# Patient Record
Sex: MALE | Race: WHITE | Employment: OTHER | URBAN - METROPOLITAN AREA
[De-identification: names, ages, dates, MRNs, and addresses within clinical notes are randomized per-mention and may not be internally consistent; named-entity substitution may affect disease eponyms.]

---

## 2019-01-02 ENCOUNTER — DOCUMENTATION ONLY (OUTPATIENT)
Dept: OTHER | Facility: CLINIC | Age: 69
End: 2019-01-02

## 2019-03-07 ENCOUNTER — HOSPITAL ENCOUNTER (OUTPATIENT)
Dept: RESPIRATORY THERAPY | Facility: HOSPITAL | Age: 69
Discharge: HOME | End: 2019-03-07
Attending: FAMILY MEDICINE | Admitting: FAMILY MEDICINE
Payer: MEDICARE

## 2019-03-07 LAB
COHGB MFR BLD: 4 % (ref 0–2)
HGB BLD-MCNC: 9 G/DL (ref 13.3–17.7)

## 2019-03-07 PROCEDURE — 85018 HEMOGLOBIN: CPT | Performed by: FAMILY MEDICINE

## 2019-03-07 PROCEDURE — 94726 PLETHYSMOGRAPHY LUNG VOLUMES: CPT | Mod: 26 | Performed by: INTERNAL MEDICINE

## 2019-03-07 PROCEDURE — 94729 DIFFUSING CAPACITY: CPT

## 2019-03-07 PROCEDURE — 94060 EVALUATION OF WHEEZING: CPT

## 2019-03-07 PROCEDURE — 25000125 ZZHC RX 250: Performed by: FAMILY MEDICINE

## 2019-03-07 PROCEDURE — 82375 ASSAY CARBOXYHB QUANT: CPT | Performed by: FAMILY MEDICINE

## 2019-03-07 PROCEDURE — 94060 EVALUATION OF WHEEZING: CPT | Mod: 26 | Performed by: INTERNAL MEDICINE

## 2019-03-07 PROCEDURE — 94726 PLETHYSMOGRAPHY LUNG VOLUMES: CPT

## 2019-03-07 PROCEDURE — 36415 COLL VENOUS BLD VENIPUNCTURE: CPT | Performed by: FAMILY MEDICINE

## 2019-03-07 PROCEDURE — 94729 DIFFUSING CAPACITY: CPT | Mod: 26 | Performed by: INTERNAL MEDICINE

## 2019-03-07 RX ORDER — ALBUTEROL SULFATE 0.83 MG/ML
2.5 SOLUTION RESPIRATORY (INHALATION)
Status: COMPLETED | OUTPATIENT
Start: 2019-03-07 | End: 2019-03-07

## 2019-03-07 RX ADMIN — ALBUTEROL SULFATE 2.5 MG: 2.5 SOLUTION RESPIRATORY (INHALATION) at 15:26

## 2019-03-09 ENCOUNTER — DOCUMENTATION ONLY (OUTPATIENT)
Dept: OTHER | Facility: CLINIC | Age: 69
End: 2019-03-09

## 2019-04-10 ENCOUNTER — OFFICE VISIT (OUTPATIENT)
Dept: SURGERY | Facility: OTHER | Age: 69
End: 2019-04-10
Attending: SURGERY
Payer: MEDICARE

## 2019-04-10 VITALS
HEART RATE: 73 BPM | TEMPERATURE: 97.3 F | WEIGHT: 261 LBS | OXYGEN SATURATION: 88 % | DIASTOLIC BLOOD PRESSURE: 52 MMHG | SYSTOLIC BLOOD PRESSURE: 94 MMHG

## 2019-04-10 DIAGNOSIS — Z79.01 LONG TERM (CURRENT) USE OF ANTICOAGULANTS: ICD-10-CM

## 2019-04-10 DIAGNOSIS — Z12.11 SPECIAL SCREENING FOR MALIGNANT NEOPLASMS, COLON: Primary | ICD-10-CM

## 2019-04-10 DIAGNOSIS — I10 ESSENTIAL HYPERTENSION: ICD-10-CM

## 2019-04-10 DIAGNOSIS — D50.9 MICROCYTIC ANEMIA: ICD-10-CM

## 2019-04-10 PROBLEM — Z86.0101 HISTORY OF ADENOMATOUS POLYP OF COLON: Status: ACTIVE | Noted: 2019-03-18

## 2019-04-10 PROBLEM — H81.11 BPPV (BENIGN PAROXYSMAL POSITIONAL VERTIGO), RIGHT: Status: ACTIVE | Noted: 2019-03-18

## 2019-04-10 PROBLEM — R42 DIZZINESS: Status: ACTIVE | Noted: 2019-03-18

## 2019-04-10 PROBLEM — R45.4 IRRITABILITY AND ANGER: Status: ACTIVE | Noted: 2018-12-04

## 2019-04-10 LAB
ANION GAP SERPL CALCULATED.3IONS-SCNC: 4 MMOL/L (ref 3–14)
APTT PPP: 39 SEC (ref 24–37)
BUN SERPL-MCNC: 17 MG/DL (ref 7–30)
CALCIUM SERPL-MCNC: 8.7 MG/DL (ref 8.5–10.1)
CHLORIDE SERPL-SCNC: 105 MMOL/L (ref 94–109)
CO2 SERPL-SCNC: 29 MMOL/L (ref 20–32)
CREAT SERPL-MCNC: 1.26 MG/DL (ref 0.66–1.25)
ERYTHROCYTE [DISTWIDTH] IN BLOOD BY AUTOMATED COUNT: 24.3 % (ref 10–15)
GFR SERPL CREATININE-BSD FRML MDRD: 58 ML/MIN/{1.73_M2}
GLUCOSE SERPL-MCNC: 100 MG/DL (ref 70–99)
HCT VFR BLD AUTO: 33.8 % (ref 40–53)
HGB BLD-MCNC: 9 G/DL (ref 13.3–17.7)
INR PPP: 3.28 (ref 0.8–1.2)
MCH RBC QN AUTO: 20 PG (ref 26.5–33)
MCHC RBC AUTO-ENTMCNC: 26.6 G/DL (ref 31.5–36.5)
MCV RBC AUTO: 75 FL (ref 78–100)
PLATELET # BLD AUTO: 401 10E9/L (ref 150–450)
POTASSIUM SERPL-SCNC: 4.5 MMOL/L (ref 3.4–5.3)
RBC # BLD AUTO: 4.51 10E12/L (ref 4.4–5.9)
SODIUM SERPL-SCNC: 138 MMOL/L (ref 133–144)
WBC # BLD AUTO: 11.2 10E9/L (ref 4–11)

## 2019-04-10 PROCEDURE — 80048 BASIC METABOLIC PNL TOTAL CA: CPT | Mod: ZL | Performed by: SURGERY

## 2019-04-10 PROCEDURE — 85730 THROMBOPLASTIN TIME PARTIAL: CPT | Mod: ZL | Performed by: SURGERY

## 2019-04-10 PROCEDURE — 93010 ELECTROCARDIOGRAM REPORT: CPT | Performed by: INTERNAL MEDICINE

## 2019-04-10 PROCEDURE — G0463 HOSPITAL OUTPT CLINIC VISIT: HCPCS

## 2019-04-10 PROCEDURE — 85610 PROTHROMBIN TIME: CPT | Mod: ZL | Performed by: SURGERY

## 2019-04-10 PROCEDURE — 36415 COLL VENOUS BLD VENIPUNCTURE: CPT | Mod: ZL | Performed by: SURGERY

## 2019-04-10 PROCEDURE — 93005 ELECTROCARDIOGRAM TRACING: CPT

## 2019-04-10 PROCEDURE — 99204 OFFICE O/P NEW MOD 45 MIN: CPT | Performed by: SURGERY

## 2019-04-10 PROCEDURE — 85027 COMPLETE CBC AUTOMATED: CPT | Mod: ZL | Performed by: SURGERY

## 2019-04-10 PROCEDURE — G0463 HOSPITAL OUTPT CLINIC VISIT: HCPCS | Mod: 25

## 2019-04-10 RX ORDER — LEVOTHYROXINE SODIUM 100 UG/1
100 TABLET ORAL
COMMUNITY
Start: 2019-02-14 | End: 2022-01-01

## 2019-04-10 RX ORDER — METOPROLOL TARTRATE 25 MG/1
TABLET, FILM COATED ORAL
COMMUNITY
Start: 2019-03-12

## 2019-04-10 RX ORDER — WARFARIN SODIUM 5 MG/1
TABLET ORAL
COMMUNITY
Start: 2019-03-20

## 2019-04-10 RX ORDER — ATORVASTATIN CALCIUM 40 MG/1
TABLET, FILM COATED ORAL
COMMUNITY
Start: 2019-01-09

## 2019-04-10 RX ORDER — GABAPENTIN 300 MG/1
CAPSULE ORAL
COMMUNITY
Start: 2018-12-04

## 2019-04-10 RX ORDER — GATIFLOXACIN 5 MG/ML
SOLUTION/ DROPS OPHTHALMIC
Refills: 1 | Status: ON HOLD | COMMUNITY
Start: 2019-02-09 | End: 2019-04-29

## 2019-04-10 RX ORDER — HYDROCODONE BITARTRATE AND ACETAMINOPHEN 5; 325 MG/1; MG/1
TABLET ORAL
COMMUNITY
Start: 2019-01-08

## 2019-04-10 RX ORDER — PREDNISOLONE ACETATE 10 MG/ML
SUSPENSION/ DROPS OPHTHALMIC
Refills: 1 | Status: ON HOLD | COMMUNITY
Start: 2019-02-09 | End: 2019-04-29

## 2019-04-10 RX ORDER — KETOROLAC TROMETHAMINE 5 MG/ML
SOLUTION OPHTHALMIC
Refills: 1 | Status: ON HOLD | COMMUNITY
Start: 2019-02-08 | End: 2019-04-29

## 2019-04-10 RX ORDER — LISINOPRIL 20 MG/1
TABLET ORAL
COMMUNITY
Start: 2019-04-08

## 2019-04-10 RX ORDER — ASPIRIN 325 MG
81 TABLET ORAL
COMMUNITY
Start: 2004-06-07

## 2019-04-10 RX ORDER — PAROXETINE 20 MG/1
TABLET, FILM COATED ORAL
Refills: 1 | COMMUNITY
Start: 2019-02-08

## 2019-04-10 RX ORDER — NITROGLYCERIN 0.4 MG/1
1 TABLET SUBLINGUAL EVERY 5 MIN PRN
COMMUNITY
Start: 2017-10-27

## 2019-04-10 RX ORDER — CELECOXIB 200 MG/1
CAPSULE ORAL
Refills: 0 | Status: ON HOLD | COMMUNITY
Start: 2019-02-09 | End: 2019-04-29

## 2019-04-10 RX ORDER — ALBUTEROL SULFATE 90 UG/1
AEROSOL, METERED RESPIRATORY (INHALATION)
COMMUNITY
Start: 2019-04-09

## 2019-04-10 RX ORDER — SODIUM, POTASSIUM,MAG SULFATES 17.5-3.13G
SOLUTION, RECONSTITUTED, ORAL ORAL
Qty: 2 BOTTLE | Refills: 0 | Status: ON HOLD | OUTPATIENT
Start: 2019-04-10 | End: 2019-04-29

## 2019-04-10 RX ORDER — TADALAFIL 20 MG/1
20 TABLET ORAL
COMMUNITY
Start: 2018-02-28

## 2019-04-10 RX ORDER — CYCLOBENZAPRINE HCL 10 MG
TABLET ORAL
COMMUNITY
Start: 2018-12-04

## 2019-04-10 RX ORDER — HYDROCHLOROTHIAZIDE 25 MG/1
TABLET ORAL
COMMUNITY
Start: 2019-02-06

## 2019-04-10 ASSESSMENT — PAIN SCALES - GENERAL: PAINLEVEL: SEVERE PAIN (6)

## 2019-04-10 NOTE — PROGRESS NOTES
[facility] Surgery Consultation    CC:  Anemia    HPI:  This 68 year old year old male is seen at the request of Papi Forbes for evaluation of anemia.  The history is obtained from the patient, and reviewing the medical record.  He is good medical historian. He says that he was being evaluated by his primary and had labs done which demonstrated microcytic anemia. He has no complaints of hematuria, hematochezia, melena, hematemesis. He has no abdominal pain, bloating, or cramping. He has undergone previous colonoscopy with multiple tubular adenomas found. He has no reflux, regurgitation or heartburn symptoms. He is on full dose aspirin and coumadin for below the knee DVT.    Past Medical History:   Diagnosis Date     Anemia      Cataract      Colon polyps      COPD (chronic obstructive pulmonary disease) (H)      Degeneration of lumbar or lumbosacral intervertebral disc 10/6/2006     DVT, lower extremity, recurrent (H) 7/7/2015     Hyperlipidemia 6/6/2006     Hypertension 6/6/2006     Hypothyroidism        Past Surgical History:   Procedure Laterality Date     CATARACT IOL, RT/LT  2019     COLONOSCOPY  2012     STENT  2002       No family history on file.    Social History     Tobacco Use     Smoking status: Current Every Day Smoker     Smokeless tobacco: Former User   Substance Use Topics     Alcohol use: None     Drug use: None       Prior to Admission medications    Medication Sig Start Date End Date Taking? Authorizing Provider   aspirin (ASA) 325 MG tablet  6/7/04  Yes Reported, Patient   atorvastatin (LIPITOR) 40 MG tablet TAKE 1 TABLET DAILY AT BEDTIME 1/9/19  Yes Reported, Patient   cyclobenzaprine (FLEXERIL) 10 MG tablet TAKE 1 TABLET THREE TIMES A DAY 12/4/18  Yes Reported, Patient   diclofenac (VOLTAREN) 1 % topical gel Apply 4 g topically 12/31/18  Yes Reported, Patient   gabapentin (NEURONTIN) 300 MG capsule TAKE 2 CAPSULES THREE TIMES A DAY 12/4/18  Yes Reported, Patient   levothyroxine  (SYNTHROID/LEVOTHROID) 100 MCG tablet Take 100 mcg by mouth 2/14/19  Yes Reported, Patient   Na Sulfate-K Sulfate-Mg Sulf (SUPREP BOWEL PREP KIT) solution Drink 1 bottle 6PM prior to procedure followed by 2 16 oz glasses water over next hour. Repeat with 2nd bottle 6 hours prior to procedure 4/10/19  Yes Nacho Shore MD   nitroGLYcerin (NITROSTAT) 0.4 MG sublingual tablet Place 1 tablet under the tongue every 5 minutes as needed 10/27/17  Yes Reported, Patient   tadalafil (CIALIS) 20 MG tablet Take 20 mg by mouth 2/28/18  Yes Reported, Patient   celecoxib (CELEBREX) 200 MG capsule TK 1 C PO BID. 2/9/19   Reported, Patient   gatifloxacin (ZYMAXID) 0.5 % ophthalmic solution INSTILL 1 GTT IN THE RIGHT EYE 3 TIMES DAILY  STARTING 3 DAYS PRIOR TO SURGERY AND CONTINUE FOR 7 DAYS AFTER SURGERY. 2/9/19   Reported, Patient   hydrochlorothiazide (HYDRODIURIL) 25 MG tablet  2/6/19   Reported, Patient   HYDROcodone-acetaminophen (NORCO) 5-325 MG tablet  1/8/19   Reported, Patient   ketorolac (ACULAR) 0.5 % ophthalmic solution  2/8/19   Reported, Patient   lisinopril (PRINIVIL/ZESTRIL) 20 MG tablet  4/8/19   Reported, Patient   metoprolol tartrate (LOPRESSOR) 25 MG tablet  3/12/19   Reported, Patient   PARoxetine (PAXIL) 20 MG tablet TK 1 T PO ONCE D. 2/8/19   Reported, Patient   prednisoLONE acetate (PRED FORTE) 1 % ophthalmic suspension  2/9/19   Reported, Patient   PROAIR  (90 Base) MCG/ACT inhaler  4/9/19   Reported, Patient   warfarin (COUMADIN) 5 MG tablet  3/20/19   Reported, Patient       Pt denied problems with bleeding or anesthesia  No mood altering drug use.       Allergies   Allergen Reactions     Penicillins Shortness Of Breath       REVIEW OF SYSTEMS:  Ten point review of systems negative except those mentioned in the HPI.     The patient denies sleep apnea, latex allergies or MRSA    OBJECTIVE:    BP 94/52 (BP Location: Right arm, Patient Position: Chair, Cuff Size: Adult Large)   Pulse 73   Temp  97.3  F (36.3  C) (Tympanic)   Wt 118.4 kg (261 lb)   SpO2 (!) 88%     GENERAL: Generally appears well, in no distress with appropriate affect.  HEENT:   Sclerae anicteric - No cervical, supra/infraclavicular lymphadenopathy  Respiratory:  Lungs clear to ausculation bilaterally with good air excursion  Cardiovascular:  Regular Rate and Rhythm with no murmurs gallops or rubs, normal   Abdomen: soft, non-tender, non-distended  :  deferred  Extremities:  Extremities normal. No deformities, edema, or skin discoloration.  Skin:  no suspicious lesions or rashes  Neurological: grossly intact  Psych:  Alert, oriented, affect appropriate with normal decision making ability.      IMPRESSION:  67 yo male with anemia    PLAN:  I recommend that we proceed with upper and lower endoscopy for diagnostic evaluation of his microcytic anemia.  The indications, risks, benefits and technical aspects of esophagogastroduodenoscopy were reviewed with her questions asked and answered.  The indications, risks, benefits and technical aspects of whole colon colonoscopy were outlined with risks including, but not limited to, perforation, bleeding and inability to visualize entire colon.  Management of each was reviewed.  The need of mechanical preparation of the colon was reviewed along with the use of monitored anesthetic care.  The patient's questions were asked and answered.  Scheduled first available date.      Thank you for allowing me to participate in the care of your patient.       Nacho Shore MD    4/10/2019  2:41 PM    cc:  Papi Forbes

## 2019-04-10 NOTE — PATIENT INSTRUCTIONS
At [facility], we want to make sure that your colonoscopy is as pleasant as possible. This guide is designed to answer any questions you might have and to walk you through the preparations you will need to make before your procedure.  Should you have additional questions, please feel free to contact us at any other the numbers listed below. Thank you for choosing [facility].    Clinic Health Unit Coordinator: [phone]  Clinic Nurse: [phone]  Surgery Education Nurse: [phone]    GUIDE TO YOUR COLONOSCOPY WITH SUPREP/Upper Endoscopy    Date of Procedure:   4/29/19  with Dr. Shore  Admit time: Surgery Department will call you the day before your procedure by 5pm with your admit time. If your surgery is on Monday, please expect a call on Friday.  If we were unable to reach you before 5PM, you may call admitting at [phone]    Call the surgery nurse or your primary care provider if you should become ill within 1 week of your procedure and we will reschedule it when you are healthy. This can include fever, vomiting, chills, sore throat, cough, chest congestion,  runny nose, symptoms of a cold or any other illness.     Questions or concerns can be directed to the clinic or surgery education nurse at any of the numbers listed above. If you have a scheduling or appointment question, please call the Health Unit Coordinator between 8am and 4pm Monday through Friday. After hours or on weekends, please call [phone] to postpone.     Lab and X-ray/EKG tests needed today.    COLONOSCOPY PREP    7 DAYS BEFORE THE EXAM:  Do not take your 325 mg Aspirin (switch to 81 mg Aspirin 7 days before colonoscopy)or other NSAIDS (Ibuprofen, Motrin, Aleve, Celebrex, Naproxen, etc) 7 days before your surgery. Tylenol is fine. Stop taking fiber supplements, vitamins, iron or vitamins that contain iron, and herbals.  Do not eat any corn, nuts or seeds. You may continue your 81 mg Aspirin.  If you are  "prescribed blood thinners, talk to your primary care provider or anticoagulation provider for instructions on these medications.     Please call the Registered Nurse in Surgery Education at [phone] one to two weeks before your procedure and have an allergy and medication list ready.     Arrange transportation with a responsible adult to drive you home and stay with you for the next 4 hours when you arrive home for your safety. If you need to take a taxi or the bus, you must have a responsible adult to ride with you besides the . If you do not have a responsible adult you will be cancelled.      prescriptions at your pharmacy as soon as possible. If it has been over one week since your appointment was scheduled, please call your pharmacy to verify it is still ready for .     2 DAYS BEFORE THE EXAM:   Begin a low fiber diet. No raw fruits or vegatables or whole grains.  Please see the list of foods you can have and foods to avoid on page 3 of the separate \"Split-Dose SuPrep\" colonoscopy instruction packet. Drink at least 4-6 large glasses of sports drink each day (not red or purple).    1 DAY BEFORE THE EXAM:  No solid food or milk products after midnight. Drink only clear liquids all day. Nothing red or purple. Please see the list of liquids you can have and what to avoid on page 2 of the separate \"Split-Dose SuPrep\" colonoscopy instruction packet.       AT 6:00 PM THE EVENING PRIOR TO PROCEDURE:  Pour one 16 ounce bottle of Suprep liquid into the mixing container.  Add cool drinking water to the 16 ounce line on the container and mix.  Drink ALL the liquid in the container. You must drink 2 or more 16 ounce containers of water over the next hour. Stay near a toilet while using this medication.     DAY OF COLONOSCOPY PROCEDURE:  6 HOURS PRIOR TO THE EXAM (set an alarm):  Repeat the previous instructions with the 2nd bottle of Suprep  You must drink 2 or more 16 ounce containers of water over " "the next hour.  You should be done with with prep 4 hours before the exam.    You may continue clear liquids until 4 hours prior to exam.   If you must take medication, take it with a sip of water.    Wear comfortable clothes. No jewelry, body piercings, make-up, nail polish, hair spray, lotions, perfumes or colognes. Shower before you arrive.  Pass Christian in Admitting through the [place] Entrance.  You must have a  with you and and adult available to stay with your for 4 hours at home. The medicine used in this test will make you sleepy. If you do not have someone, please reschedule or your test will be cancelled. It is recommended that you do not drive for 24 hours after your test. Do not operate power equipment, drink alcoholic beverages, make important decisions or sign legal documents.     COLONOSCOPY FREQUENTLY ASKED QUESTIONS  What is a colonoscopy?    A colonoscopy is a test to look at the lining of your large intestine. The purpose of the exam is to check for abnormalities including growths called \"polyps\" that can lead to serious disease. A flexible scope is inserted into your rectum by the doctor to examine your large intestine.    What are polyps?  Polyps are abnormal growths on the lining of the colon. Most polyps are not cancerous, but some polyps have the potential to turn into cancer with time. Polyps can also bleed. For these reasons, most polyps are removed during a colonoscopy and sent to the laboratory for microscopic examination.    What preparation is needed?  The colon must be completely clean for the procedure to be performed. You may be given one or two different prep solutions to cleanse your bowel. You will also need to follow a clear liquid diet the day before your procedure.    What happens after the procedure?  After your procedure is complete, you will be taken back to your day surgery room where you will be monitored for approximately 1 hour. You can expect to feel drowsy for several " hours afterward. You may experience some cramping or bloating due to the air introduced into your colon during the exam. You will not be able to drive or operate machinery the rest of the day. You will be given written discharge instructions and appropriate learning material before you go home. You must have an adult to stay at home with you for the next 4 hours after you leave the hospital for your safety.    When will I find out the results of my test?  Your surgeon will talk to you and your designated  before you leave and usually the preliminary results can be given to you at that time. If a biopsy was taken during your procedure, it will be sent to the laboratory for examination. Results usually take one week. You will be contacted by phone or by letter with results.      TIPS FOR COLON CLEANSING BEFORE YOUR COLONOSCOPY  To get accurate results from your exam, your colon must be completely clean and empty. Please follow your doctor's instructions. If you do not, you may need to repeat both the exam and colon-cleansing process.    The medicine you take may cause bloating, nausea and other discomfort. Follow these tips to make the process as easy as possible:     You may use alcohol-free baby wipes to ease anal irritation. You may also use Vaseline to help protect the skin. Other options include Tucks wipes, hemorrhoid treatments and hydrocortisone cream.    Stay near a toilet! You will have diarrhea (loose watery stools) and may also have chills. Dress for comfort. Expect to feel discomfort until the stool clears from your colon. This usually takes about 2 to 4 hours.    If you followed your doctor's orders and your stool is a clear or yellow liquid, you are ready for the exam. If you are not sure if your colon is clean, please call your clinic and ask to speak to a nurse.       If SuPrep is not covered by insurance and you would like to opt for Golytely as an alternate prep, please call the nurse to  request a new prescription to your pharmacy. Sometimes the pharmacy will contact our office in advance if the prescription is not covered. The dietary instructions are the same for both bowel preps. Take Dulcolax 5mg at bedtime 2 nights before procedure and at 3pm day before procedure. Drink 1/2 of the the golytely at 6pm day before procedure 1  8 oz glass every 15 minutes. Repeat with the 2nd 1/2 of Golytely day of procedure 6 hours prior to check in time.

## 2019-04-10 NOTE — NURSING NOTE
Chief Complaint   Patient presents with     Consult     Vickey Forbes referring        Initial BP 94/52 (BP Location: Right arm, Patient Position: Chair, Cuff Size: Adult Large)   Pulse 73   Temp 97.3  F (36.3  C) (Tympanic)   Wt 118.4 kg (261 lb)   SpO2 (!) 88%  There is no height or weight on file to calculate BMI.  Medication Reconciliation: complete    Adry Easton LPN

## 2019-04-25 ENCOUNTER — ANESTHESIA EVENT (OUTPATIENT)
Dept: SURGERY | Facility: HOSPITAL | Age: 69
End: 2019-04-25
Payer: MEDICARE

## 2019-04-25 ASSESSMENT — COPD QUESTIONNAIRES
COPD: 1
CAT_SEVERITY: MILD

## 2019-04-25 ASSESSMENT — LIFESTYLE VARIABLES: TOBACCO_USE: 1

## 2019-04-25 NOTE — ANESTHESIA PREPROCEDURE EVALUATION
Anesthesia Pre-Procedure Evaluation    Patient: Brannon Spence   MRN: [id number] : 1950          Preoperative Diagnosis: MICROCYTIC ANEMIA    Procedure(s):  UPPER ENDOSCOPY,  COLONOSCOPY    Past Medical History:   Diagnosis Date     Anemia      Cataract      Colon polyps      COPD (chronic obstructive pulmonary disease) (H)      Degeneration of lumbar or lumbosacral intervertebral disc 10/6/2006     DVT, lower extremity, recurrent (H) 2015     Hyperlipidemia 2006     Hypertension 2006     Hypothyroidism      Past Surgical History:   Procedure Laterality Date     CATARACT IOL, RT/LT  2019     COLONOSCOPY       STENT         Anesthesia Evaluation     . Pt has had prior anesthetic.     No history of anesthetic complications          ROS/MED HX    ENT/Pulmonary:     (+)tobacco use, Current use 1.0 PPD packs/day  mild COPD, , . .    Neurologic:     (+)other neuro BPPV    Cardiovascular:     (+) Dyslipidemia, hypertension--CAD, -past MI (2000),-stent,2000  1 . : . . . :. .       METS/Exercise Tolerance:     Hematologic:     (+) History of blood clots Anemia (Microcytic anemia), -      Musculoskeletal: Comment: Degeneration of lumbar or lumbosacral intervertebral disc  (+) arthritis,  other musculoskeletal- DDD      GI/Hepatic:     (+) bowel prep,       Renal/Genitourinary:  - ROS Renal section negative       Endo:     (+) thyroid problem hypothyroidism, Obesity, .      Psychiatric:  - neg psychiatric ROS       Infectious Disease:  - neg infectious disease ROS       Malignancy:      - no malignancy   Other:    - neg other ROS                      Physical Exam  Normal systems: cardiovascular    Airway   Mallampati: III  TM distance: >3 FB  Neck ROM: full    Dental   (+) upper dentures, lower dentures and missing    Cardiovascular   Rhythm and rate: regular and normal      Pulmonary    breath sounds clear to auscultation(+) decreased breath sounds               Lab Results   Component  Value Date    WBC 11.2 (H) 04/10/2019    HGB 9.0 (L) 04/10/2019    HCT 33.8 (L) 04/10/2019     04/10/2019     04/10/2019    POTASSIUM 4.5 04/10/2019    CHLORIDE 105 04/10/2019    CO2 29 04/10/2019    BUN 17 04/10/2019    CR 1.26 (H) 04/10/2019     (H) 04/10/2019    HAILY 8.7 04/10/2019    PTT 39 (H) 04/10/2019    INR 3.28 (H) 04/10/2019       Preop Vitals  BP Readings from Last 3 Encounters:   04/10/19 94/52    Pulse Readings from Last 3 Encounters:   04/10/19 73      Resp Readings from Last 3 Encounters:   No data found for Resp    SpO2 Readings from Last 3 Encounters:   04/10/19 (!) 88%      Temp Readings from Last 1 Encounters:   04/10/19 97.3  F (36.3  C) (Tympanic)    Ht Readings from Last 1 Encounters:   No data found for Ht      Wt Readings from Last 1 Encounters:   04/10/19 118.4 kg (261 lb)    There is no height or weight on file to calculate BMI.       Anesthesia Plan      History & Physical Review  History and physical reviewed and following examination; no interval change.    ASA Status:  3 .    NPO Status:  > 8 hours    Plan for MAC with Intravenous and Propofol induction. Maintenance will be TIVA.  Reason for MAC:  Procedure to face, neck, head or breast, Chronic cardiopulmonary disease (G9) and Other - see comments  PONV prophylaxis:  Ondansetron (or other 5HT-3)   4-10-19  Surgeon requests deep sedation. Patient is an ASA 3 and will have prolonged procedure as planned combined upper endoscopy and colonoscopy. Will provide MAC.       Postoperative Care  Postoperative pain management:  IV analgesics.      Consents  Anesthetic plan, risks, benefits and alternatives discussed with:  Patient..                 BUD cMdowell CRNA

## 2019-04-29 ENCOUNTER — HOSPITAL ENCOUNTER (OUTPATIENT)
Facility: HOSPITAL | Age: 69
Discharge: HOME | End: 2019-04-29
Attending: SURGERY | Admitting: SURGERY
Payer: MEDICARE

## 2019-04-29 ENCOUNTER — ANESTHESIA (OUTPATIENT)
Dept: SURGERY | Facility: HOSPITAL | Age: 69
End: 2019-04-29
Payer: MEDICARE

## 2019-04-29 ENCOUNTER — APPOINTMENT (OUTPATIENT)
Dept: LAB | Facility: HOSPITAL | Age: 69
End: 2019-04-29
Attending: SURGERY
Payer: MEDICARE

## 2019-04-29 VITALS
SYSTOLIC BLOOD PRESSURE: 128 MMHG | OXYGEN SATURATION: 94 % | RESPIRATION RATE: 18 BRPM | DIASTOLIC BLOOD PRESSURE: 74 MMHG | TEMPERATURE: 97.1 F

## 2019-04-29 DIAGNOSIS — K25.4 GASTRIC ULCER WITH HEMORRHAGE, UNSPECIFIED CHRONICITY: Primary | ICD-10-CM

## 2019-04-29 LAB — INR PPP: 1.18 (ref 0.8–1.2)

## 2019-04-29 PROCEDURE — 43239 EGD BIOPSY SINGLE/MULTIPLE: CPT | Performed by: NURSE ANESTHETIST, CERTIFIED REGISTERED

## 2019-04-29 PROCEDURE — 36415 COLL VENOUS BLD VENIPUNCTURE: CPT | Performed by: NURSE ANESTHETIST, CERTIFIED REGISTERED

## 2019-04-29 PROCEDURE — 36000050 ZZH SURGERY LEVEL 2 1ST 30 MIN: Performed by: SURGERY

## 2019-04-29 PROCEDURE — 43239 EGD BIOPSY SINGLE/MULTIPLE: CPT | Performed by: ANESTHESIOLOGY

## 2019-04-29 PROCEDURE — 25000128 H RX IP 250 OP 636: Performed by: NURSE ANESTHETIST, CERTIFIED REGISTERED

## 2019-04-29 PROCEDURE — 25000125 ZZHC RX 250: Performed by: NURSE ANESTHETIST, CERTIFIED REGISTERED

## 2019-04-29 PROCEDURE — 71000027 ZZH RECOVERY PHASE 2 EACH 15 MINS: Performed by: SURGERY

## 2019-04-29 PROCEDURE — 88342 IMHCHEM/IMCYTCHM 1ST ANTB: CPT | Mod: TC | Performed by: SURGERY

## 2019-04-29 PROCEDURE — 43239 EGD BIOPSY SINGLE/MULTIPLE: CPT | Performed by: SURGERY

## 2019-04-29 PROCEDURE — 37000009 ZZH ANESTHESIA TECHNICAL FEE, EACH ADDTL 15 MIN: Performed by: SURGERY

## 2019-04-29 PROCEDURE — 25800030 ZZH RX IP 258 OP 636: Performed by: NURSE ANESTHETIST, CERTIFIED REGISTERED

## 2019-04-29 PROCEDURE — 45385 COLONOSCOPY W/LESION REMOVAL: CPT | Performed by: SURGERY

## 2019-04-29 PROCEDURE — 36000052 ZZH SURGERY LEVEL 2 EA 15 ADDTL MIN: Performed by: SURGERY

## 2019-04-29 PROCEDURE — 40000306 ZZH STATISTIC PRE PROC ASSESS II: Performed by: SURGERY

## 2019-04-29 PROCEDURE — 88305 TISSUE EXAM BY PATHOLOGIST: CPT | Mod: TC | Performed by: SURGERY

## 2019-04-29 PROCEDURE — 37000008 ZZH ANESTHESIA TECHNICAL FEE, 1ST 30 MIN: Performed by: SURGERY

## 2019-04-29 PROCEDURE — 45380 COLONOSCOPY AND BIOPSY: CPT | Mod: 59 | Performed by: SURGERY

## 2019-04-29 PROCEDURE — 27210794 ZZH OR GENERAL SUPPLY STERILE: Performed by: SURGERY

## 2019-04-29 PROCEDURE — 85610 PROTHROMBIN TIME: CPT | Performed by: NURSE ANESTHETIST, CERTIFIED REGISTERED

## 2019-04-29 RX ORDER — ALBUTEROL SULFATE 0.83 MG/ML
2.5 SOLUTION RESPIRATORY (INHALATION) EVERY 4 HOURS PRN
Status: DISCONTINUED | OUTPATIENT
Start: 2019-04-29 | End: 2019-04-29 | Stop reason: HOSPADM

## 2019-04-29 RX ORDER — SODIUM CHLORIDE, SODIUM LACTATE, POTASSIUM CHLORIDE, CALCIUM CHLORIDE 600; 310; 30; 20 MG/100ML; MG/100ML; MG/100ML; MG/100ML
INJECTION, SOLUTION INTRAVENOUS CONTINUOUS
Status: DISCONTINUED | OUTPATIENT
Start: 2019-04-29 | End: 2019-04-29 | Stop reason: HOSPADM

## 2019-04-29 RX ORDER — ONDANSETRON 2 MG/ML
4 INJECTION INTRAMUSCULAR; INTRAVENOUS EVERY 30 MIN PRN
Status: DISCONTINUED | OUTPATIENT
Start: 2019-04-29 | End: 2019-04-29 | Stop reason: HOSPADM

## 2019-04-29 RX ORDER — FENTANYL CITRATE 50 UG/ML
25-50 INJECTION, SOLUTION INTRAMUSCULAR; INTRAVENOUS
Status: DISCONTINUED | OUTPATIENT
Start: 2019-04-29 | End: 2019-04-29 | Stop reason: HOSPADM

## 2019-04-29 RX ORDER — FLUMAZENIL 0.1 MG/ML
0.2 INJECTION, SOLUTION INTRAVENOUS
Status: DISCONTINUED | OUTPATIENT
Start: 2019-04-29 | End: 2019-04-29 | Stop reason: HOSPADM

## 2019-04-29 RX ORDER — SUCRALFATE ORAL 1 G/10ML
1 SUSPENSION ORAL 4 TIMES DAILY
Qty: 420 ML | Refills: 1 | Status: SHIPPED | OUTPATIENT
Start: 2019-04-29 | End: 2019-05-20

## 2019-04-29 RX ORDER — HYDRALAZINE HYDROCHLORIDE 20 MG/ML
2.5-5 INJECTION INTRAMUSCULAR; INTRAVENOUS EVERY 10 MIN PRN
Status: DISCONTINUED | OUTPATIENT
Start: 2019-04-29 | End: 2019-04-29 | Stop reason: HOSPADM

## 2019-04-29 RX ORDER — LIDOCAINE HYDROCHLORIDE 20 MG/ML
INJECTION, SOLUTION INFILTRATION; PERINEURAL PRN
Status: DISCONTINUED | OUTPATIENT
Start: 2019-04-29 | End: 2019-04-29

## 2019-04-29 RX ORDER — PROPOFOL 10 MG/ML
INJECTION, EMULSION INTRAVENOUS PRN
Status: DISCONTINUED | OUTPATIENT
Start: 2019-04-29 | End: 2019-04-29

## 2019-04-29 RX ORDER — MEPERIDINE HYDROCHLORIDE 50 MG/ML
12.5 INJECTION INTRAMUSCULAR; INTRAVENOUS; SUBCUTANEOUS
Status: DISCONTINUED | OUTPATIENT
Start: 2019-04-29 | End: 2019-04-29 | Stop reason: HOSPADM

## 2019-04-29 RX ORDER — NALOXONE HYDROCHLORIDE 0.4 MG/ML
.1-.4 INJECTION, SOLUTION INTRAMUSCULAR; INTRAVENOUS; SUBCUTANEOUS
Status: DISCONTINUED | OUTPATIENT
Start: 2019-04-29 | End: 2019-04-29 | Stop reason: HOSPADM

## 2019-04-29 RX ORDER — LIDOCAINE 40 MG/G
CREAM TOPICAL
Status: DISCONTINUED | OUTPATIENT
Start: 2019-04-29 | End: 2019-04-29 | Stop reason: HOSPADM

## 2019-04-29 RX ORDER — ONDANSETRON 4 MG/1
4 TABLET, ORALLY DISINTEGRATING ORAL EVERY 30 MIN PRN
Status: DISCONTINUED | OUTPATIENT
Start: 2019-04-29 | End: 2019-04-29 | Stop reason: HOSPADM

## 2019-04-29 RX ORDER — HYDROMORPHONE HYDROCHLORIDE 1 MG/ML
.3-.5 INJECTION, SOLUTION INTRAMUSCULAR; INTRAVENOUS; SUBCUTANEOUS EVERY 10 MIN PRN
Status: DISCONTINUED | OUTPATIENT
Start: 2019-04-29 | End: 2019-04-29 | Stop reason: HOSPADM

## 2019-04-29 RX ADMIN — PROPOFOL 20 MG: 10 INJECTION, EMULSION INTRAVENOUS at 09:27

## 2019-04-29 RX ADMIN — PROPOFOL 20 MG: 10 INJECTION, EMULSION INTRAVENOUS at 09:55

## 2019-04-29 RX ADMIN — PROPOFOL 20 MG: 10 INJECTION, EMULSION INTRAVENOUS at 09:38

## 2019-04-29 RX ADMIN — PROPOFOL 20 MG: 10 INJECTION, EMULSION INTRAVENOUS at 09:26

## 2019-04-29 RX ADMIN — PROPOFOL 20 MG: 10 INJECTION, EMULSION INTRAVENOUS at 09:32

## 2019-04-29 RX ADMIN — PROPOFOL 30 MG: 10 INJECTION, EMULSION INTRAVENOUS at 09:17

## 2019-04-29 RX ADMIN — PROPOFOL 20 MG: 10 INJECTION, EMULSION INTRAVENOUS at 09:47

## 2019-04-29 RX ADMIN — PROPOFOL 40 MG: 10 INJECTION, EMULSION INTRAVENOUS at 09:36

## 2019-04-29 RX ADMIN — PROPOFOL 20 MG: 10 INJECTION, EMULSION INTRAVENOUS at 09:24

## 2019-04-29 RX ADMIN — PROPOFOL 20 MG: 10 INJECTION, EMULSION INTRAVENOUS at 10:00

## 2019-04-29 RX ADMIN — PROPOFOL 50 MG: 10 INJECTION, EMULSION INTRAVENOUS at 09:15

## 2019-04-29 RX ADMIN — PROPOFOL 20 MG: 10 INJECTION, EMULSION INTRAVENOUS at 09:22

## 2019-04-29 RX ADMIN — PROPOFOL 30 MG: 10 INJECTION, EMULSION INTRAVENOUS at 09:18

## 2019-04-29 RX ADMIN — PROPOFOL 20 MG: 10 INJECTION, EMULSION INTRAVENOUS at 09:50

## 2019-04-29 RX ADMIN — PROPOFOL 20 MG: 10 INJECTION, EMULSION INTRAVENOUS at 10:02

## 2019-04-29 RX ADMIN — PROPOFOL 20 MG: 10 INJECTION, EMULSION INTRAVENOUS at 09:31

## 2019-04-29 RX ADMIN — PROPOFOL 20 MG: 10 INJECTION, EMULSION INTRAVENOUS at 10:06

## 2019-04-29 RX ADMIN — PROPOFOL 20 MG: 10 INJECTION, EMULSION INTRAVENOUS at 10:13

## 2019-04-29 RX ADMIN — LIDOCAINE HYDROCHLORIDE 40 MG: 20 INJECTION, SOLUTION INFILTRATION; PERINEURAL at 09:15

## 2019-04-29 RX ADMIN — PROPOFOL 40 MG: 10 INJECTION, EMULSION INTRAVENOUS at 09:41

## 2019-04-29 RX ADMIN — PROPOFOL 40 MG: 10 INJECTION, EMULSION INTRAVENOUS at 09:44

## 2019-04-29 RX ADMIN — PROPOFOL 30 MG: 10 INJECTION, EMULSION INTRAVENOUS at 09:20

## 2019-04-29 RX ADMIN — PROPOFOL 20 MG: 10 INJECTION, EMULSION INTRAVENOUS at 10:09

## 2019-04-29 RX ADMIN — PROPOFOL 20 MG: 10 INJECTION, EMULSION INTRAVENOUS at 09:58

## 2019-04-29 RX ADMIN — PROPOFOL 20 MG: 10 INJECTION, EMULSION INTRAVENOUS at 09:52

## 2019-04-29 RX ADMIN — SODIUM CHLORIDE, POTASSIUM CHLORIDE, SODIUM LACTATE AND CALCIUM CHLORIDE: 600; 310; 30; 20 INJECTION, SOLUTION INTRAVENOUS at 09:12

## 2019-04-29 NOTE — DISCHARGE INSTRUCTIONS
UPPER ENDOSCOPY    AFTER THE PROCEDURE    You will return to Same Day Surgery to rest for about an hour before you go home.    The doctor will talk with you and your family.    A family member/friend may visit with you.    You may burp up any air remaining in your stomach.    You may feel dizzy or light-headed from the medicine.    Your nurse will go over the discharge instructions with you and your caregiver and answer any of your questions.      You will be contacted the next day to check on how you are doing.    If biopsies were taken, you will be contacted with the results usually within 3 days.  BACK AT HOME    Rest for an hour or two after you get home.    When your throat is no longer numb and you have a gag reflex, take a few sips of cool water.  If you can swallow comfortably, you may start eating again.    You may have a mild sore throat for the rest of the day.    You will be contacted with results by the surgeons office within a week. If not contacted in one week, call the surgeons office.  WHAT TO WATCH FOR:  Problems rarely occur after the exam, but it is important to be aware of the early signs of a complication.  Call your doctor immediately if you have:    Difficulty swallowing or breathing    Unusual pain in your stomach or chest    Vomiting blood or dark material that looks like coffee grounds    Black or tarry stools    Temperature over 101.5 degrees    MORE QUESTIONS?  Please ask your doctor or nurse before the exam begins  or call your doctor at the clinic.    IF YOU MUST CANCEL YOUR PROCEDURE THE EVENING/NIGHT BEFORE, PLEASE CALL HOSPITAL ADMITTING -[phone] OR TOLL FREE [phone], [phone].    Phone Numbers:  [facility] - [phone]  [facility] - [phone]  Surgery Patient Education - [phone] or toll free [phone]    INSTRUCTIONS AFTER COLONOSCOPY    WHEN YOU ARE BACK HOME:    Plan to rest for an hour or two after you get home.    You may have  some cramping or pressure until you pass gas.    You may resume your regular medications.    Eat a small, light meal at first, and then gradually return to normal meal sizes.    You will be contacted the next day to see how you are doing.  If you had a polyp removed:    Slight bleeding may occur.  You may have a slight blood stain on the toilet paper after a bowel movement.    To lessen the chance of bleeding, avoid heavy exercise for ONE WEEK.  This includes heavy lifting, vigorous sport activities, and heavy physical labor.  You may resume your normal sexual activity.      Avoid aspirin or aspirin products if instructed by your doctor.    If there is a polyp or biopsy, you will be contacted with results within one week.     WHAT TO WATCH FOR:  Problems rarely occur after the exam; however, it is important for you to watch for early signs of possible problems.  If you have     Unusual pain in your abdomen    Nausea and vomiting that persists    Excessive bleeding    Black or bloody bowel movements    Fever or temperature above 100.6 F  Please call your doctor ([facility] [phone]) or go to the nearest hospital emergency room.    Post-Anesthesia Patient Instructions    IMMEDIATELY FOLLOWING SURGERY:  Do not drive or operate machinery for the first twenty four hours after surgery.  Do not make any important decisions for twenty four hours after surgery or while taking narcotic pain medications or sedatives.  If you develop intractable nausea and vomiting or a severe headache please notify your doctor immediately.    FOLLOW-UP:  Please make an appointment with your surgeon as instructed. You do not need to follow up with anesthesia unless specifically instructed to do so.    WOUND CARE INSTRUCTIONS (if applicable):  Keep a dry clean dressing on the anesthesia/puncture wound site if there is drainage.  Once the wound has quit draining you may leave it open to air.  Generally you should leave the bandage intact  for twenty four hours unless there is drainage.  If the epidural site drains for more than 36-48 hours please call the anesthesia department.    QUESTIONS?:  Please feel free to call your physician or the hospital  if you have any questions, and they will be happy to assist you.

## 2019-04-29 NOTE — ANESTHESIA CARE TRANSFER NOTE
Patient: Brannon Spence    Procedure(s):  UPPER ENDOSCOPY WITH BIOPSY AND APC OF STOMACH,  COLONOSCOPY WITH POLYPECTOMY AND BIOPSY    Diagnosis: MICROCYTIC ANEMIA  Diagnosis Additional Information: No value filed.    Anesthesia Type:   MAC     Note:  Airway :Room Air  Patient transferred to:Phase II  Handoff Report: Identifed the Patient, Identified the Reponsible Provider, Reviewed the pertinent medical history, Discussed the surgical course, Reviewed Intra-OP anesthesia mangement and issues during anesthesia, Set expectations for post-procedure period and Allowed opportunity for questions and acknowledgement of understanding      Vitals: (Last set prior to Anesthesia Care Transfer)    CRNA VITALS  4/29/2019 0945 - 4/29/2019 1019      4/29/2019             Pulse:  105    Ht Rate:  104    SpO2:  95 %    Resp Rate (set):  8                Electronically Signed By: BUD Montana CRNA  April 29, 2019  10:19 AM

## 2019-04-29 NOTE — OR NURSING
in and spoke with pt.Up w/o vertigo.Denies pain.Dyspneic with exertion.Patient and responsible adult given discharge instructions with no questions regarding instructions. Nanette score 20. Pain level 0/10.  Discharged from unit via w/c. Patient discharged to home.

## 2019-04-29 NOTE — ANESTHESIA POSTPROCEDURE EVALUATION
Patient: Brannon Spence    Procedure(s):  UPPER ENDOSCOPY WITH BIOPSY AND APC OF STOMACH,  COLONOSCOPY WITH POLYPECTOMY AND BIOPSY    Diagnosis:MICROCYTIC ANEMIA  Diagnosis Additional Information: No value filed.    Anesthesia Type:  MAC    Note:  Anesthesia Post Evaluation    Patient location during evaluation: Phase 2 and Bedside  Patient participation: Able to fully participate in evaluation  Level of consciousness: awake and alert  Pain management: adequate  Airway patency: patent  Cardiovascular status: acceptable  Respiratory status: acceptable  Hydration status: stable  PONV: none     Anesthetic complications: None          Last vitals:  Vitals:    04/29/19 1035 04/29/19 1040 04/29/19 1045   BP: (!) 146/111 133/80 136/88   Resp: 16 16 18   Temp:      SpO2: 93% (!) 91% (!) 91%         Electronically Signed By: Aneudy Valero MD  April 29, 2019  10:52 AM

## 2019-04-29 NOTE — OP NOTE
Brannon Spence MRN# [id number]   YOB: 1950 Age: 68 year old      Date of Admission:  4/29/2019    Primary care provider: Waqas Benjamin    PREOPERATIVE DIAGNOSIS:  Anemia      POSTOPERATIVE DIAGNOSES:    Antral ulcers, henriquez's esophagitis, ascending colon polyp x 3, ascending colon mucosal abnormality, rectal polyp      PROCEDURE:  Esophagogastroduodenoscopy with cold forceps biopsies, argon plasma coagulation to antral ulcers, colonoscopy with hot snare polypectomy and cold forceps biopsy.       HISTORY OF PRESENT ILLNESS:  This 68 year old male developed anemia requiring upper endoscopy     OPERATIVE FINDINGS:  The gastroesophageal junction was noted 40cm from the incisors.  The Z line was irregular with changes suggesting reflux.  There was evidence of ulceration in the antrum.  There were mucosal changes suggestive of Henriquez's at the GE junction. There were multiple ascending colon polyps. In the ascending colon there was a mucosal abnormality which was biopsied. There was a small rectal polyp.    Specimen(s) submitted to pathology:  Antral biopsies, antral ulcer biopsies, GE junction biopsies, ascending colon polyps x 3, ascending colon biopsy, rectal polyp    PROCEDURE:  With the patient in the supine position on the transport cart, IV sedation was administered by the nurse anesthetist.  Her correct identity and planned procedure were confirmed during a requisite timeout pause.  A bite block was placed and the fiberoptic endoscope was introduced and negotiated through the cricopharyngeus without difficulty.  The length of the esophagus was examined without findings.  The gastroesophageal junction was noted 40 cm from the incisors; the Z line was irregular without ulceration, bleeding source or stricture.  There was evidence of Henriquez's change.  The stomach was entered and the pylorus was traversed easily.  The gastric mucosa was abnormal; there was evidence of ulceration in the gastric  antrum.  The duodenum was without finding.  The endoscope was returned to the body of the stomach and retroflex confirmed the absence of abnormality in the cardia.      The antral ulcerations were biopsied with cold forceps. Hemostasis was then maintained with APC. After hemostasis was maintained random cold forceps biopsies were obtained of the antrum for H. pylori.  Hemostasis was judged adequate on visualization.   The endoscope was returned to the GE junction.  Circumferential biopsies were obtained; hemostasis was satisfactory.  A second circumferential examination of the esophagus was performed on slow withdrawal of the endoscope without additional finding.  T    The patient was then positioned for his colonoscopy.  The anus was digitally dilated and the fiberoptic colonoscope was introduced and negotiated through the length of the colon to the cecal base.  The cecum was intubated and its landmarks clearly identified.  A circumferential examination of the mucosa on introduction of the colonoscope and on its slow withdrawal confirmed the absence of neoplasia, inflammation and/or stricture. within the ascending colon there were multiple polyps. They were removed with a combination of cold forceps biopsy and hot snare polypectomy. The polypectomy sites were inspected and hemostasis maintained. There was a sessile abnormality to the mucosa of the ascending colon. This was biopsied in multiple locations with cold forceps biopsy. The area was inspected and hemostasis was maintained.  There were multiple large mouthed diverticuli noted.  Retroflex in the rectal ampulla showed a small polyp. This was removed with cold forceps biopsy. The area was inspected and hemostasis maintained..  Air was aspirated and the colonoscope was withdrawn; the patient was returned to day surgery in good condition, without suggestion of complication and with our invitation to return in 5 years for followup screening examination.   The  post surgical debriefing was held and acknowledged at completion.          Nacho Shore MD     4/29/2019 10:21 AM

## 2019-05-06 LAB — COPATH REPORT: NORMAL

## 2019-05-22 ENCOUNTER — OFFICE VISIT (OUTPATIENT)
Dept: SURGERY | Facility: OTHER | Age: 69
End: 2019-05-22
Attending: SURGERY
Payer: MEDICARE

## 2019-05-22 VITALS
HEART RATE: 72 BPM | OXYGEN SATURATION: 92 % | SYSTOLIC BLOOD PRESSURE: 118 MMHG | TEMPERATURE: 97.3 F | DIASTOLIC BLOOD PRESSURE: 60 MMHG | WEIGHT: 250 LBS

## 2019-05-22 DIAGNOSIS — D12.6 TUBULAR ADENOMA OF COLON: ICD-10-CM

## 2019-05-22 DIAGNOSIS — K22.70 BARRETT'S ESOPHAGUS DETERMINED BY ENDOSCOPY: ICD-10-CM

## 2019-05-22 DIAGNOSIS — K25.0 ACUTE GASTRIC ULCER WITH HEMORRHAGE: Primary | ICD-10-CM

## 2019-05-22 PROCEDURE — G0463 HOSPITAL OUTPT CLINIC VISIT: HCPCS

## 2019-05-22 PROCEDURE — 99213 OFFICE O/P EST LOW 20 MIN: CPT | Performed by: SURGERY

## 2019-05-22 ASSESSMENT — PAIN SCALES - GENERAL: PAINLEVEL: MODERATE PAIN (5)

## 2019-05-22 NOTE — NURSING NOTE
Chief Complaint   Patient presents with     RECHECK     Here to discuss EGD/Colonoscopy results from 4/29/19       Initial /60 (BP Location: Left arm, Patient Position: Chair, Cuff Size: Adult Regular)   Pulse 72   Temp 97.3  F (36.3  C) (Tympanic)   Wt 113.4 kg (250 lb)   SpO2 92%  There is no height or weight on file to calculate BMI.  Medication Reconciliation: complete    VALENTIN VALDOVINOS LPN

## 2019-05-22 NOTE — PATIENT INSTRUCTIONS
"Thank you for allowing our surgical team to participate in your care. Please review the following instructions to prepare for your upcoming Upper Endoscopy. You may call any of the numbers listed below with questions you may have.  [facility] Unit Coordinator: [phone]  Clinic Surgery Nurse: [phone]  Surgery Education Nurse: [phone]    Date of Procedure: 6/17/19 with Dr. Shore  Admit time: Surgery  will call you the day before your procedure by 5pm with your admit time. If your surgery is on Monday, please expect a call on Friday. If we were unable to reach you by 5PM, you may call  [phone] for your arrival time.  Preop appointment needed with your primary care provider within 30 days of procedure.    Please call the Surgery Education Nurses 1-2 weeks prior to your surgery date at  [phone] for further instructions. Please have your medication/allergy lists ready.    Do not take Aspirin (325mg), other NSAIDs (Ibuprofen, Motrin, Aleve, Celebrex, Naproxen, etc...) vitamins or supplements 7 days before your surgery.   Please call your primary care provider/Coumadin nurse for instruction on your Coumadin and Aspirin.     Please call the clinic surgery nurse or your regular doctor if you become ill within 1-2 weeks of the procedure. (vomiting, diarrhea, fever, cough, cold or any other symptoms of illness)    Do not eat any solid foods or milk products after 10pm the night prior to surgery. You may have clear liquids only up until 4 hours prior to surgery. Please see the list of liquids you may have and you can not have on page 2 of the separate \"Instructions for Your Upper Endoscopy\" packet.     You will need a responsible adult available to drive you home and stay with you for at least 4 hours after you leave the hospital. You will not be allowed to drive yourself. If you need to take a taxi or the bus you must have a responsible person to ride with you (not the taxi/). Your " procedure will be cancelled if you do not bring a responsible adult.    Questions or concerns can be directed to the clinic or surgery education nurse at any of the numbers listed above. If you have a scheduling or appointment question, please call the Health Unit Coordinator between 8am and 4pm Monday through Friday. After hours or on weekends, please call [phone] to postpone.

## 2019-05-23 NOTE — PROGRESS NOTES
SUBJECTIVE:  Mr. Spence presents for discussion of his pathology and further treatments. He says that he has not noticed any further dark stools, abdominal pain. He does have some intermittent light headedness    OBJECTIVE:  /60 (BP Location: Left arm, Patient Position: Chair, Cuff Size: Adult Regular)   Pulse 72   Temp 97.3  F (36.3  C) (Tympanic)   Wt 113.4 kg (250 lb)   SpO2 92%     Gen: AAA, NAD    ASSESSMENT/PLAN:  69 yo male s/p endoscopy showing gastric ulcer  Barretts esophagitis  Tubular adenomas    I discussed that at this time I would recommend that he undergo a repeat upper endoscopy for evaluation of the gastric ulcer.  The indications, risks, benefits and technical aspects of esophagogastroduodenoscopy were reviewed with her questions asked and answered.  Preoperative preparation, npo after midnight preceding the date was discussed and a request made to hold aspirin containing agents one week prior to ameliorate antiplatelet effect.  Questions asked and answered - will proceed based on scheduling availability.    With regard to his Gonzales's I suggested repeated endoscopy for surveillance in 2 years and for his tubular adenomas I would recommend a 5 year follow up colonoscopy.    Nacho Shore MD

## 2019-06-12 ENCOUNTER — ANESTHESIA EVENT (OUTPATIENT)
Dept: SURGERY | Facility: HOSPITAL | Age: 69
End: 2019-06-12
Payer: MEDICARE

## 2019-06-12 ASSESSMENT — COPD QUESTIONNAIRES
CAT_SEVERITY: MILD
COPD: 1

## 2019-06-12 ASSESSMENT — LIFESTYLE VARIABLES: TOBACCO_USE: 1

## 2019-06-12 NOTE — ANESTHESIA PREPROCEDURE EVALUATION
Anesthesia Pre-Procedure Evaluation    Patient: Brannon Spence   MRN: [id number] : 1950          Preoperative Diagnosis: MICROCYTIC ANEMIA    Procedure(s):  UPPER ENDOSCOPY,  COLONOSCOPY    Past Medical History:   Diagnosis Date     Anemia      Cataract      Colon polyps      COPD (chronic obstructive pulmonary disease) (H)      Degeneration of lumbar or lumbosacral intervertebral disc 10/6/2006     DVT, lower extremity, recurrent (H) 2015     Hyperlipidemia 2006     Hypertension 2006     Hypothyroidism      Past Surgical History:   Procedure Laterality Date     CATARACT IOL, RT/LT       COLONOSCOPY       ENDOSCOPY UPPER, COLONOSCOPY, COMBINED N/A 2019    Procedure: UPPER ENDOSCOPY WITH BIOPSY AND APC OF STOMACH,  COLONOSCOPY WITH POLYPECTOMY AND BIOPSY;  Surgeon: Nacho Shore MD;  Location: [facility] OR     STENT         Anesthesia Evaluation     . Pt has had prior anesthetic.     No history of anesthetic complications          ROS/MED HX    ENT/Pulmonary:     (+)tobacco use, Current use 1.0 PPD packs/day  mild COPD, , . .    Neurologic:     (+)other neuro BPPV, hx Bell's palsy    Cardiovascular:     (+) Dyslipidemia, hypertension-range: rx metoprolol, -CAD, -past MI (2000),-stent,2000  1 . Taking blood thinners : . . . :. . Previous cardiac testing date:results:date: results:ECG reviewed date:4/10/2019 results:NSR, possible lateral infarct, age undetermined, inferior-posterior infarct, age undetermined date: results:          METS/Exercise Tolerance:     Hematologic:     (+) History of blood clots pt is anticoagulated, Anemia (Microcytic anemia), -      Musculoskeletal: Comment: Degeneration of lumbar or lumbosacral intervertebral disc  (+) arthritis,  other musculoskeletal- DDD      GI/Hepatic:     (+) bowel prep, Other GI/Hepatic Gonzales's esophagus      Renal/Genitourinary:  - ROS Renal section negative   (+) chronic renal disease (creatinine 1.26),       Endo:  Comment: impaired fasting glucose    (+) thyroid problem hypothyroidism, Obesity, .      Psychiatric:  - neg psychiatric ROS       Infectious Disease:  - neg infectious disease ROS       Malignancy:      - no malignancy   Other:    - neg other ROS                        Physical Exam      Airway   Mallampati: III  TM distance: >3 FB  Neck ROM: full    Dental   (+) upper dentures, lower dentures and missing    Cardiovascular   Rhythm and rate: regular and normal      Pulmonary    breath sounds clear to auscultation            Lab Results   Component Value Date    WBC 11.2 (H) 04/10/2019    HGB 9.0 (L) 04/10/2019    HCT 33.8 (L) 04/10/2019     04/10/2019     04/10/2019    POTASSIUM 4.5 04/10/2019    CHLORIDE 105 04/10/2019    CO2 29 04/10/2019    BUN 17 04/10/2019    CR 1.26 (H) 04/10/2019     (H) 04/10/2019    HAILY 8.7 04/10/2019    PTT 39 (H) 04/10/2019    INR 1.18 04/29/2019       Preop Vitals  BP Readings from Last 3 Encounters:   05/22/19 118/60   04/29/19 128/74   04/10/19 94/52    Pulse Readings from Last 3 Encounters:   05/22/19 72   04/10/19 73      Resp Readings from Last 3 Encounters:   04/29/19 18    SpO2 Readings from Last 3 Encounters:   05/22/19 92%   04/29/19 94%   04/10/19 (!) 88%      Temp Readings from Last 1 Encounters:   05/22/19 97.3  F (36.3  C) (Tympanic)    Ht Readings from Last 1 Encounters:   No data found for Ht      Wt Readings from Last 1 Encounters:   05/22/19 113.4 kg (250 lb)    There is no height or weight on file to calculate BMI.       Anesthesia Plan      History & Physical Review  History and physical reviewed and following examination; no interval change.    ASA Status:  3 .    NPO Status:  > 8 hours    Plan for MAC with Intravenous and Propofol induction. Maintenance will be TIVA.  Reason for MAC:  Chronic cardiopulmonary disease (G9) and Procedure to face, neck, head or breast  PONV prophylaxis:  Ondansetron (or other 5HT-3)  INR: 1.04  Surgeon requests deep  sedation. Patient is an ASA 3. Will provide MAC.      Postoperative Care  Postoperative pain management:  IV analgesics.      Consents  Anesthetic plan, risks, benefits and alternatives discussed with:  Patient..                   BUD Zaragoza CRNA

## 2019-06-17 ENCOUNTER — APPOINTMENT (OUTPATIENT)
Dept: LAB | Facility: HOSPITAL | Age: 69
End: 2019-06-17
Attending: SURGERY
Payer: MEDICARE

## 2019-06-17 ENCOUNTER — HOSPITAL ENCOUNTER (OUTPATIENT)
Facility: HOSPITAL | Age: 69
Discharge: HOME | End: 2019-06-17
Attending: SURGERY | Admitting: SURGERY
Payer: MEDICARE

## 2019-06-17 ENCOUNTER — ANESTHESIA (OUTPATIENT)
Dept: SURGERY | Facility: HOSPITAL | Age: 69
End: 2019-06-17
Payer: MEDICARE

## 2019-06-17 VITALS
HEART RATE: 100 BPM | TEMPERATURE: 97 F | HEIGHT: 74 IN | SYSTOLIC BLOOD PRESSURE: 106 MMHG | DIASTOLIC BLOOD PRESSURE: 61 MMHG | WEIGHT: 250 LBS | OXYGEN SATURATION: 92 % | BODY MASS INDEX: 32.08 KG/M2 | RESPIRATION RATE: 16 BRPM

## 2019-06-17 LAB — INR PPP: 1.04 (ref 0.8–1.2)

## 2019-06-17 PROCEDURE — 85610 PROTHROMBIN TIME: CPT | Performed by: NURSE ANESTHETIST, CERTIFIED REGISTERED

## 2019-06-17 PROCEDURE — 71000027 ZZH RECOVERY PHASE 2 EACH 15 MINS: Performed by: SURGERY

## 2019-06-17 PROCEDURE — 36415 COLL VENOUS BLD VENIPUNCTURE: CPT | Performed by: NURSE ANESTHETIST, CERTIFIED REGISTERED

## 2019-06-17 PROCEDURE — 43239 EGD BIOPSY SINGLE/MULTIPLE: CPT | Performed by: NURSE ANESTHETIST, CERTIFIED REGISTERED

## 2019-06-17 PROCEDURE — 25000125 ZZHC RX 250: Performed by: SURGERY

## 2019-06-17 PROCEDURE — 40000306 ZZH STATISTIC PRE PROC ASSESS II: Performed by: SURGERY

## 2019-06-17 PROCEDURE — 88305 TISSUE EXAM BY PATHOLOGIST: CPT | Mod: TC | Performed by: SURGERY

## 2019-06-17 PROCEDURE — 43239 EGD BIOPSY SINGLE/MULTIPLE: CPT | Performed by: SURGERY

## 2019-06-17 PROCEDURE — 25000125 ZZHC RX 250: Performed by: NURSE ANESTHETIST, CERTIFIED REGISTERED

## 2019-06-17 PROCEDURE — 37000008 ZZH ANESTHESIA TECHNICAL FEE, 1ST 30 MIN: Performed by: SURGERY

## 2019-06-17 PROCEDURE — 25000128 H RX IP 250 OP 636: Performed by: NURSE ANESTHETIST, CERTIFIED REGISTERED

## 2019-06-17 PROCEDURE — 27210794 ZZH OR GENERAL SUPPLY STERILE: Performed by: SURGERY

## 2019-06-17 PROCEDURE — 25800030 ZZH RX IP 258 OP 636: Performed by: NURSE ANESTHETIST, CERTIFIED REGISTERED

## 2019-06-17 PROCEDURE — 36000050 ZZH SURGERY LEVEL 2 1ST 30 MIN: Performed by: SURGERY

## 2019-06-17 PROCEDURE — 43239 EGD BIOPSY SINGLE/MULTIPLE: CPT | Performed by: ANESTHESIOLOGY

## 2019-06-17 RX ORDER — FLUMAZENIL 0.1 MG/ML
0.2 INJECTION, SOLUTION INTRAVENOUS
Status: DISCONTINUED | OUTPATIENT
Start: 2019-06-17 | End: 2019-06-17 | Stop reason: HOSPADM

## 2019-06-17 RX ORDER — PROPOFOL 10 MG/ML
INJECTION, EMULSION INTRAVENOUS PRN
Status: DISCONTINUED | OUTPATIENT
Start: 2019-06-17 | End: 2019-06-17

## 2019-06-17 RX ORDER — SODIUM CHLORIDE, SODIUM LACTATE, POTASSIUM CHLORIDE, CALCIUM CHLORIDE 600; 310; 30; 20 MG/100ML; MG/100ML; MG/100ML; MG/100ML
INJECTION, SOLUTION INTRAVENOUS CONTINUOUS
Status: DISCONTINUED | OUTPATIENT
Start: 2019-06-17 | End: 2019-06-17 | Stop reason: HOSPADM

## 2019-06-17 RX ORDER — MEPERIDINE HYDROCHLORIDE 50 MG/ML
12.5 INJECTION INTRAMUSCULAR; INTRAVENOUS; SUBCUTANEOUS
Status: DISCONTINUED | OUTPATIENT
Start: 2019-06-17 | End: 2019-06-17 | Stop reason: HOSPADM

## 2019-06-17 RX ORDER — LIDOCAINE 40 MG/G
CREAM TOPICAL
Status: DISCONTINUED | OUTPATIENT
Start: 2019-06-17 | End: 2019-06-17 | Stop reason: HOSPADM

## 2019-06-17 RX ORDER — ONDANSETRON 2 MG/ML
4 INJECTION INTRAMUSCULAR; INTRAVENOUS EVERY 30 MIN PRN
Status: DISCONTINUED | OUTPATIENT
Start: 2019-06-17 | End: 2019-06-17 | Stop reason: HOSPADM

## 2019-06-17 RX ORDER — NALOXONE HYDROCHLORIDE 0.4 MG/ML
.1-.4 INJECTION, SOLUTION INTRAMUSCULAR; INTRAVENOUS; SUBCUTANEOUS
Status: DISCONTINUED | OUTPATIENT
Start: 2019-06-17 | End: 2019-06-17 | Stop reason: HOSPADM

## 2019-06-17 RX ORDER — ONDANSETRON 4 MG/1
4 TABLET, ORALLY DISINTEGRATING ORAL EVERY 30 MIN PRN
Status: DISCONTINUED | OUTPATIENT
Start: 2019-06-17 | End: 2019-06-17 | Stop reason: HOSPADM

## 2019-06-17 RX ORDER — LIDOCAINE HYDROCHLORIDE 20 MG/ML
INJECTION, SOLUTION INFILTRATION; PERINEURAL PRN
Status: DISCONTINUED | OUTPATIENT
Start: 2019-06-17 | End: 2019-06-17

## 2019-06-17 RX ORDER — NALOXONE HYDROCHLORIDE 0.4 MG/ML
.1-.4 INJECTION, SOLUTION INTRAMUSCULAR; INTRAVENOUS; SUBCUTANEOUS
Status: DISCONTINUED | OUTPATIENT
Start: 2019-06-17 | End: 2019-06-17

## 2019-06-17 RX ADMIN — PROPOFOL 50 MG: 10 INJECTION, EMULSION INTRAVENOUS at 09:05

## 2019-06-17 RX ADMIN — LIDOCAINE HYDROCHLORIDE 40 MG: 20 INJECTION, SOLUTION INFILTRATION; PERINEURAL at 09:03

## 2019-06-17 RX ADMIN — PROPOFOL 50 MG: 10 INJECTION, EMULSION INTRAVENOUS at 09:08

## 2019-06-17 RX ADMIN — SODIUM CHLORIDE, POTASSIUM CHLORIDE, SODIUM LACTATE AND CALCIUM CHLORIDE: 600; 310; 30; 20 INJECTION, SOLUTION INTRAVENOUS at 08:44

## 2019-06-17 RX ADMIN — PROPOFOL 80 MG: 10 INJECTION, EMULSION INTRAVENOUS at 09:03

## 2019-06-17 NOTE — OP NOTE
Brannon Spence MRN# [id number]   YOB: 1950 Age: 68 year old      Date of Admission:  6/17/2019    Primary care provider: Waqas Benjamin    PREOPERATIVE DIAGNOSIS:  Gastric ulcer      POSTOPERATIVE DIAGNOSES:    Healed antral ulcer with pyloric mucosal changes      PROCEDURE:  Esophagogastroduodenoscopy with cold forceps biopsies.       HISTORY OF PRESENT ILLNESS:  This 68 year old male developed anemia and on his initial EGD there was evidence of an antral ulcer requiring repeat endoscopy     OPERATIVE FINDINGS:  The gastroesophageal junction was noted 40cm from the incisors.  The Z line was irregular with changes suggesting reflux.  There was a healed ulcer in the antrum of the stomach. There was mucosal abnormality with a white patch that was biopsied.    Specimen(s) submitted to pathology:   Pyloric biopsy    PROCEDURE:  With the patient in the supine position on the transport cart, IV sedation was administered by the nurse anesthetist.  His correct identity and planned procedure were confirmed during a requisite timeout pause.  A bite block was placed and the fiberoptic endoscope was introduced and negotiated through the cricopharyngeus without difficulty.  The length of the esophagus was examined without findings.  The gastroesophageal junction was noted 40 cm from the incisors; the Z line was irregular without ulceration, bleeding source or stricture.  There was  evidence of Gonzales's change.  The stomach was entered and the pylorus was traversed easily.  The gastric mucosa was normal; there was no evidence of gastric polyp, ulcer or bleeding source. The previous antral ulcer was healed. In the pylorus there was a light mucosal patch that was biopsied.  The duodenum was similarly without finding.  The endoscope was returned to the body of the stomach and retroflex confirmed the absence of abnormality in the cardia.      The endoscope was returned to the GE junction.  Circumferential biopsies  were obtained; hemostasis was satisfactory.  A second circumferential examination of the esophagus was performed on slow withdrawal of the endoscope without additional finding.  The procedure was satisfactorially tolerated; there was no appreciable blood loss and the patient was returned to Day Surgery in good condition.      Nacho Shore MD  6/17/2019  9:15 AM

## 2019-06-17 NOTE — H&P
[facility] Surgery Consultation    CC:  Gastric ulcer    HPI:  This 68 year old year old male is seen at the request of Papi Forbes for evaluation of anemia.  The history is obtained from the patient, and reviewing the medical record.  He is good medical historian. He says that he was being evaluated by his primary and had labs done which demonstrated microcytic anemia. He has no complaints of hematuria, hematochezia, melena, hematemesis. He has no abdominal pain, bloating, or cramping. He has undergone previous colonoscopy with multiple tubular adenomas found. He has no reflux, regurgitation or heartburn symptoms. He is on full dose aspirin and coumadin for below the knee DVT.     HPI copied from last visit with no changes. He underwent an upper and lower endoscopy with findings of gastric ulceration with recommendation for repeat endoscopy.    Past Medical History:   Diagnosis Date     Anemia      Cataract      Colon polyps      COPD (chronic obstructive pulmonary disease) (H)      Degeneration of lumbar or lumbosacral intervertebral disc 10/6/2006     DVT, lower extremity, recurrent (H) 7/7/2015     Hyperlipidemia 6/6/2006     Hypertension 6/6/2006     Hypothyroidism        Past Surgical History:   Procedure Laterality Date     CATARACT IOL, RT/LT  2019     COLONOSCOPY  2012     ENDOSCOPY UPPER, COLONOSCOPY, COMBINED N/A 4/29/2019    Procedure: UPPER ENDOSCOPY WITH BIOPSY AND APC OF STOMACH,  COLONOSCOPY WITH POLYPECTOMY AND BIOPSY;  Surgeon: Nacho Shore MD;  Location: [facility] OR     STENT  2002       History reviewed. No pertinent family history.    Social History     Tobacco Use     Smoking status: Current Every Day Smoker     Smokeless tobacco: Former User   Substance Use Topics     Alcohol use: None     Drug use: None       Prior to Admission medications    Medication Sig Start Date End Date Taking? Authorizing Provider   aspirin (ASA) 325 MG tablet Take 81 mg by mouth  6/7/04  Yes Reported,  Patient   atorvastatin (LIPITOR) 40 MG tablet TAKE 1 TABLET DAILY AT BEDTIME 1/9/19  Yes Reported, Patient   cyclobenzaprine (FLEXERIL) 10 MG tablet TAKE 1 TABLET THREE TIMES A DAY 12/4/18  Yes Reported, Patient   diclofenac (VOLTAREN) 1 % topical gel Apply 4 g topically 12/31/18  Yes Reported, Patient   gabapentin (NEURONTIN) 300 MG capsule TAKE 2 CAPSULES THREE TIMES A DAY 12/4/18  Yes Reported, Patient   hydrochlorothiazide (HYDRODIURIL) 25 MG tablet  2/6/19  Yes Reported, Patient   HYDROcodone-acetaminophen (NORCO) 5-325 MG tablet  1/8/19  Yes Reported, Patient   levothyroxine (SYNTHROID/LEVOTHROID) 100 MCG tablet Take 100 mcg by mouth 2/14/19  Yes Reported, Patient   lisinopril (PRINIVIL/ZESTRIL) 20 MG tablet  4/8/19  Yes Reported, Patient   metoprolol tartrate (LOPRESSOR) 25 MG tablet  3/12/19  Yes Reported, Patient   omeprazole (PRILOSEC) 20 MG DR capsule Take 2 capsules (40 mg) by mouth daily 4/29/19 6/24/19 Yes Nacho Shore MD   PARoxetine (PAXIL) 20 MG tablet TK 1 T PO ONCE D. 2/8/19  Yes Reported, Patient   PROAIR  (90 Base) MCG/ACT inhaler  4/9/19  Yes Reported, Patient   tadalafil (CIALIS) 20 MG tablet Take 20 mg by mouth 2/28/18  Yes Reported, Patient   warfarin (COUMADIN) 5 MG tablet  3/20/19  Yes Reported, Patient   nitroGLYcerin (NITROSTAT) 0.4 MG sublingual tablet Place 1 tablet under the tongue every 5 minutes as needed 10/27/17   Reported, Patient   sucralfate (CARAFATE) 1 GM/10ML suspension Take 10 mLs (1 g) by mouth 4 times daily for 21 days 4/29/19 5/20/19  Nacho Shore MD       Pt denied problems with bleeding or anesthesia  No mood altering drug use.       Allergies   Allergen Reactions     Penicillins Shortness Of Breath       REVIEW OF SYSTEMS:  Ten point review of systems negative except those mentioned in the HPI.     The patient denies sleep apnea, latex allergies or MRSA    OBJECTIVE:    /73   Pulse 100   Temp 97.8  F (36.6  C) (Oral)   Resp 20   Ht  "1.88 m (6' 2\")   Wt 113.4 kg (250 lb)   SpO2 94%   BMI 32.10 kg/m      GENERAL: Generally appears well, in no distress with appropriate affect.  Respiratory:  Lungs clear to ausculation bilaterally with good air excursion  Cardiovascular:  Regular Rate and Rhythm with no murmurs gallops or rubs, normal   Psych:  Alert, oriented, affect appropriate with normal decision making ability.      IMPRESSION:  67 yo male for upper endoscopy    PLAN:  Ok to proceed with endoscopy.          Nacho Shore MD    6/17/2019  8:50 AM      "

## 2019-06-17 NOTE — DISCHARGE INSTRUCTIONS
UPPER ENDOSCOPY    AFTER THE PROCEDURE    You will return to Same Day Surgery to rest for about an hour before you go home.    The doctor will talk with you and your family.    A family member/friend may visit with you.    You may burp up any air remaining in your stomach.    You may feel dizzy or light-headed from the medicine.    Your nurse will go over the discharge instructions with you and your caregiver and answer any of your questions.      You will be contacted the next day to check on how you are doing.    If biopsies were taken, you will be contacted with the results usually within 3 days.  BACK AT HOME    Rest for an hour or two after you get home.    When your throat is no longer numb and you have a gag reflex, take a few sips of cool water.  If you can swallow comfortably, you may start eating again.    You may have a mild sore throat for the rest of the day.    You will be contacted with results by the surgeons office within a week. If not contacted in one week, call the surgeons office.  WHAT TO WATCH FOR:  Problems rarely occur after the exam, but it is important to be aware of the early signs of a complication.  Call your doctor immediately if you have:    Difficulty swallowing or breathing    Unusual pain in your stomach or chest    Vomiting blood or dark material that looks like coffee grounds    Black or tarry stools    Temperature over 101.5 degrees    MORE QUESTIONS?  Please ask your doctor or nurse before the exam begins  or call your doctor at the clinic.    IF YOU MUST CANCEL YOUR PROCEDURE THE EVENING/NIGHT BEFORE, PLEASE CALL HOSPITAL ADMITTING -[phone] OR TOLL FREE [phone], [phone].    Phone Numbers:  [facility] - [phone] [phone]  [facility] - [phone]  Surgery Patient Education - [phone] or toll free [phone]    Post-Anesthesia Patient Instructions    IMMEDIATELY FOLLOWING SURGERY:  Do not drive or operate machinery for the first twenty  four hours after surgery.  Do not make any important decisions for twenty four hours after surgery or while taking narcotic pain medications or sedatives.  If you develop intractable nausea and vomiting or a severe headache please notify your doctor immediately.    FOLLOW-UP:  Please make an appointment with your surgeon as instructed. You do not need to follow up with anesthesia unless specifically instructed to do so.    WOUND CARE INSTRUCTIONS (if applicable):  Keep a dry clean dressing on the anesthesia/puncture wound site if there is drainage.  Once the wound has quit draining you may leave it open to air.  Generally you should leave the bandage intact for twenty four hours unless there is drainage.  If the epidural site drains for more than 36-48 hours please call the anesthesia department.    QUESTIONS?:  Please feel free to call your physician or the hospital  if you have any questions, and they will be happy to assist you.

## 2019-06-17 NOTE — ANESTHESIA POSTPROCEDURE EVALUATION
Patient: Brannon Spence    Procedure(s):  UPPER ENDOSCOPY WITH BIOPSIES    Diagnosis:GASTRIC ULCER  Diagnosis Additional Information: No value filed.    Anesthesia Type:  MAC    Note:  Anesthesia Post Evaluation    Patient location during evaluation: Phase 2 and Bedside  Patient participation: Able to fully participate in evaluation  Level of consciousness: awake and alert  Pain management: adequate  Airway patency: patent  Cardiovascular status: acceptable  Respiratory status: acceptable  Hydration status: stable  PONV: none     Anesthetic complications: None          Last vitals:  Vitals:    06/17/19 0930 06/17/19 0935 06/17/19 0939   BP: 106/73 118/73 118/73   Pulse:      Resp: 16 16 16   Temp:      SpO2: 92% (!) 91% 94%         Electronically Signed By: Aneudy Valero MD  June 17, 2019  9:45 AM

## 2019-06-17 NOTE — ANESTHESIA CARE TRANSFER NOTE
Patient: Brannon Spence    Procedure(s):  UPPER ENDOSCOPY WITH BIOPSIES    Diagnosis: GASTRIC ULCER  Diagnosis Additional Information: No value filed.    Anesthesia Type:   MAC     Note:  Airway :Nasal Cannula  Patient transferred to:Phase II  Handoff Report: Identifed the Patient, Identified the Reponsible Provider, Reviewed the pertinent medical history, Discussed the surgical course, Reviewed Intra-OP anesthesia mangement and issues during anesthesia, Set expectations for post-procedure period and Allowed opportunity for questions and acknowledgement of understanding      Vitals: (Last set prior to Anesthesia Care Transfer)    CRNA VITALS  6/17/2019 0846 - 6/17/2019 0916      6/17/2019             Resp Rate (observed):  1  (Abnormal)     Resp Rate (set):  8                Electronically Signed By: BUD Zaragoza CRNA  June 17, 2019  9:16 AM

## 2019-06-17 NOTE — OR NURSING
No nausea or vomiting IV dced.. Requesting to go home.Discharge instructions given to patient and patient's  No questions.  Wh/ch ride out of unit. Denies pain, nausea or dizziness. Apgar 20/20.

## 2019-06-18 LAB — COPATH REPORT: NORMAL

## 2019-06-24 ENCOUNTER — TELEPHONE (OUTPATIENT)
Dept: SURGERY | Facility: OTHER | Age: 69
End: 2019-06-24

## 2019-06-24 DIAGNOSIS — K25.4 GASTRIC ULCER WITH HEMORRHAGE, UNSPECIFIED CHRONICITY: ICD-10-CM

## 2019-06-24 NOTE — TELEPHONE ENCOUNTER
Orders have been signed and completed, thanks.    After he runs out of these he can have Dr. Benjamin fill them if he has any further symptoms of reflux

## 2019-06-24 NOTE — TELEPHONE ENCOUNTER
Notified of results. Advised patient to continue Omeprazole per Dr. Shore's recommendation on result note. The patient indicates that he is almost out of his Omeprazole and will need a refill to [facility] [place].. Will you fill this or does it need to go to Dr. Benjamin, his PCP?    Refill request pended in this encounter. Ok to leave a detailed message if he does not answer on [phone].

## 2022-01-01 ENCOUNTER — HOSPITAL ENCOUNTER (OUTPATIENT)
Dept: CT IMAGING | Facility: HOSPITAL | Age: 72
Discharge: HOME | End: 2022-03-31
Attending: FAMILY MEDICINE | Admitting: FAMILY MEDICINE
Payer: MEDICARE

## 2022-01-01 ENCOUNTER — TELEPHONE (OUTPATIENT)
Dept: SURGERY | Facility: OTHER | Age: 72
End: 2022-01-01
Payer: MEDICARE

## 2022-01-01 ENCOUNTER — MEDICAL CORRESPONDENCE (OUTPATIENT)
Dept: MRI IMAGING | Facility: HOSPITAL | Age: 72
End: 2022-01-01
Payer: MEDICARE

## 2022-01-01 ENCOUNTER — HOSPITAL ENCOUNTER (OUTPATIENT)
Facility: HOSPITAL | Age: 72
End: 2022-01-01
Attending: SURGERY | Admitting: SURGERY
Payer: MEDICARE

## 2022-01-01 ENCOUNTER — PATIENT OUTREACH (OUTPATIENT)
Dept: ONCOLOGY | Facility: OTHER | Age: 72
End: 2022-01-01
Payer: MEDICARE

## 2022-01-01 ENCOUNTER — TRANSFERRED RECORDS (OUTPATIENT)
Dept: HEALTH INFORMATION MANAGEMENT | Facility: CLINIC | Age: 72
End: 2022-01-01

## 2022-01-01 ENCOUNTER — HOSPITAL ENCOUNTER (OUTPATIENT)
Dept: MRI IMAGING | Facility: HOSPITAL | Age: 72
Discharge: HOME | End: 2022-05-18
Attending: INTERNAL MEDICINE | Admitting: INTERNAL MEDICINE
Payer: MEDICARE

## 2022-01-01 ENCOUNTER — PREP FOR PROCEDURE (OUTPATIENT)
Dept: SURGERY | Facility: OTHER | Age: 72
End: 2022-01-01

## 2022-01-01 ENCOUNTER — TELEPHONE (OUTPATIENT)
Dept: RADIATION ONCOLOGY | Facility: HOSPITAL | Age: 72
End: 2022-01-01
Payer: MEDICARE

## 2022-01-01 ENCOUNTER — CARE COORDINATION (OUTPATIENT)
Dept: RADIATION ONCOLOGY | Facility: HOSPITAL | Age: 72
End: 2022-01-01

## 2022-01-01 ENCOUNTER — ONCOLOGY VISIT (OUTPATIENT)
Dept: RADIATION ONCOLOGY | Facility: HOSPITAL | Age: 72
End: 2022-01-01
Attending: RADIOLOGY
Payer: MEDICARE

## 2022-01-01 ENCOUNTER — TELEPHONE (OUTPATIENT)
Dept: SURGERY | Facility: OTHER | Age: 72
End: 2022-01-01

## 2022-01-01 ENCOUNTER — ONCOLOGY VISIT (OUTPATIENT)
Dept: ONCOLOGY | Facility: OTHER | Age: 72
End: 2022-01-01
Attending: INTERNAL MEDICINE
Payer: MEDICARE

## 2022-01-01 ENCOUNTER — MEDICAL CORRESPONDENCE (OUTPATIENT)
Dept: CT IMAGING | Facility: HOSPITAL | Age: 72
End: 2022-01-01
Payer: MEDICARE

## 2022-01-01 ENCOUNTER — DOCUMENTATION ONLY (OUTPATIENT)
Dept: INTERVENTIONAL RADIOLOGY/VASCULAR | Facility: HOSPITAL | Age: 72
End: 2022-01-01
Payer: MEDICARE

## 2022-01-01 ENCOUNTER — TELEPHONE (OUTPATIENT)
Dept: ONCOLOGY | Facility: OTHER | Age: 72
End: 2022-01-01
Payer: MEDICARE

## 2022-01-01 ENCOUNTER — CARE COORDINATION (OUTPATIENT)
Dept: RADIATION ONCOLOGY | Facility: HOSPITAL | Age: 72
End: 2022-01-01
Payer: MEDICARE

## 2022-01-01 VITALS
HEIGHT: 72 IN | HEART RATE: 93 BPM | TEMPERATURE: 96.5 F | DIASTOLIC BLOOD PRESSURE: 60 MMHG | SYSTOLIC BLOOD PRESSURE: 110 MMHG | OXYGEN SATURATION: 96 % | RESPIRATION RATE: 20 BRPM | BODY MASS INDEX: 31.32 KG/M2 | WEIGHT: 231.26 LBS

## 2022-01-01 VITALS — OXYGEN SATURATION: 93 % | BODY MASS INDEX: 31.08 KG/M2 | HEIGHT: 72 IN | WEIGHT: 229.5 LBS

## 2022-01-01 DIAGNOSIS — Z01.818 PREOP TESTING: Primary | ICD-10-CM

## 2022-01-01 DIAGNOSIS — J90 PLEURAL EFFUSION: ICD-10-CM

## 2022-01-01 DIAGNOSIS — C34.31 MALIGNANT NEOPLASM OF LOWER LOBE OF RIGHT LUNG (H): ICD-10-CM

## 2022-01-01 DIAGNOSIS — J90 PLEURAL EFFUSION, NOT ELSEWHERE CLASSIFIED: ICD-10-CM

## 2022-01-01 DIAGNOSIS — C34.31 MALIGNANT NEOPLASM OF LOWER LOBE OF RIGHT LUNG (H): Primary | ICD-10-CM

## 2022-01-01 DIAGNOSIS — F32.A DEPRESSION: ICD-10-CM

## 2022-01-01 DIAGNOSIS — C34.2 MALIGNANT NEOPLASM OF MIDDLE LOBE OF RIGHT LUNG (H): ICD-10-CM

## 2022-01-01 LAB
ALBUMIN SERPL-MCNC: 3.5 G/DL (ref 3.4–5)
ALP SERPL-CCNC: 101 U/L (ref 40–150)
ALT SERPL W P-5'-P-CCNC: 25 U/L (ref 0–70)
ALT SERPL-CCNC: 22 U/L (ref 18–65)
ANION GAP SERPL CALCULATED.3IONS-SCNC: 1 MMOL/L (ref 3–14)
AST SERPL W P-5'-P-CCNC: 16 U/L (ref 0–45)
AST SERPL-CCNC: 19 U/L (ref 10–40)
BASOPHILS # BLD AUTO: 0.1 10E3/UL (ref 0–0.2)
BASOPHILS NFR BLD AUTO: 1 %
BILIRUB SERPL-MCNC: 0.9 MG/DL (ref 0.2–1.3)
BUN SERPL-MCNC: 16 MG/DL (ref 7–30)
CALCIUM SERPL-MCNC: 8.9 MG/DL (ref 8.5–10.1)
CHLORIDE BLD-SCNC: 100 MMOL/L (ref 94–109)
CHOLESTEROL (EXTERNAL): 161 MG/DL
CO2 SERPL-SCNC: 32 MMOL/L (ref 20–32)
CREAT SERPL-MCNC: 1.03 MG/DL (ref 0.66–1.25)
CREATININE (EXTERNAL): 1.17 MG/DL (ref 0.8–1.5)
EOSINOPHIL # BLD AUTO: 0.3 10E3/UL (ref 0–0.7)
EOSINOPHIL NFR BLD AUTO: 3 %
ERYTHROCYTE [DISTWIDTH] IN BLOOD BY AUTOMATED COUNT: 14.2 % (ref 10–15)
GFR ESTIMATED (EXTERNAL): >60 ML/MIN/1.73M2
GFR SERPL CREATININE-BSD FRML MDRD: 78 ML/MIN/1.73M2
GLUCOSE (EXTERNAL): 92 MG/DL (ref 60–99)
GLUCOSE BLD-MCNC: 124 MG/DL (ref 70–99)
HCT VFR BLD AUTO: 48.9 % (ref 40–53)
HDLC SERPL-MCNC: 42 MG/DL
HGB BLD-MCNC: 16.4 G/DL (ref 13.3–17.7)
IMM GRANULOCYTES # BLD: 0 10E3/UL
IMM GRANULOCYTES NFR BLD: 0 %
LDH SERPL L TO P-CCNC: 152 U/L (ref 85–227)
LDL CHOLESTEROL CALCULATED (EXTERNAL): 91 MG/DL
LYMPHOCYTES # BLD AUTO: 1 10E3/UL (ref 0.8–5.3)
LYMPHOCYTES NFR BLD AUTO: 10 %
MCH RBC QN AUTO: 30.5 PG (ref 26.5–33)
MCHC RBC AUTO-ENTMCNC: 33.5 G/DL (ref 31.5–36.5)
MCV RBC AUTO: 91 FL (ref 78–100)
MONOCYTES # BLD AUTO: 0.9 10E3/UL (ref 0–1.3)
MONOCYTES NFR BLD AUTO: 10 %
NEUTROPHILS # BLD AUTO: 7.3 10E3/UL (ref 1.6–8.3)
NEUTROPHILS NFR BLD AUTO: 76 %
NRBC # BLD AUTO: 0 10E3/UL
NRBC BLD AUTO-RTO: 0 /100
PLATELET # BLD AUTO: 248 10E3/UL (ref 150–450)
POTASSIUM (EXTERNAL): 4.6 MMOL/L (ref 3.5–5.1)
POTASSIUM BLD-SCNC: 4.1 MMOL/L (ref 3.4–5.3)
PROT SERPL-MCNC: 7.8 G/DL (ref 6.8–8.8)
RBC # BLD AUTO: 5.37 10E6/UL (ref 4.4–5.9)
SODIUM SERPL-SCNC: 133 MMOL/L (ref 133–144)
TRIGLYCERIDES (EXTERNAL): 139 MG/DL
TSH SERPL-ACNC: 3.96 UIU/ML (ref 0.35–4.8)
WBC # BLD AUTO: 9.6 10E3/UL (ref 4–11)

## 2022-01-01 PROCEDURE — 99205 OFFICE O/P NEW HI 60 MIN: CPT | Mod: 25 | Performed by: RADIOLOGY

## 2022-01-01 PROCEDURE — 82040 ASSAY OF SERUM ALBUMIN: CPT | Mod: ZL | Performed by: INTERNAL MEDICINE

## 2022-01-01 PROCEDURE — 255N000002 HC RX 255 OP 636: Performed by: RADIOLOGY

## 2022-01-01 PROCEDURE — 250N000011 HC RX IP 250 OP 636: Performed by: RADIOLOGY

## 2022-01-01 PROCEDURE — 99417 PROLNG OP E/M EACH 15 MIN: CPT | Performed by: INTERNAL MEDICINE

## 2022-01-01 PROCEDURE — 80053 COMPREHEN METABOLIC PANEL: CPT | Mod: ZL | Performed by: INTERNAL MEDICINE

## 2022-01-01 PROCEDURE — 99406 BEHAV CHNG SMOKING 3-10 MIN: CPT | Performed by: RADIOLOGY

## 2022-01-01 PROCEDURE — G0463 HOSPITAL OUTPT CLINIC VISIT: HCPCS

## 2022-01-01 PROCEDURE — A9585 GADOBUTROL INJECTION: HCPCS | Performed by: RADIOLOGY

## 2022-01-01 PROCEDURE — 71260 CT THORAX DX C+: CPT

## 2022-01-01 PROCEDURE — 36415 COLL VENOUS BLD VENIPUNCTURE: CPT | Mod: ZL | Performed by: INTERNAL MEDICINE

## 2022-01-01 PROCEDURE — G1004 CDSM NDSC: HCPCS

## 2022-01-01 PROCEDURE — 99417 PROLNG OP E/M EACH 15 MIN: CPT | Performed by: RADIOLOGY

## 2022-01-01 PROCEDURE — 85025 COMPLETE CBC W/AUTO DIFF WBC: CPT | Mod: ZL | Performed by: INTERNAL MEDICINE

## 2022-01-01 PROCEDURE — 99205 OFFICE O/P NEW HI 60 MIN: CPT | Performed by: INTERNAL MEDICINE

## 2022-01-01 PROCEDURE — G0463 HOSPITAL OUTPT CLINIC VISIT: HCPCS | Performed by: RADIOLOGY

## 2022-01-01 PROCEDURE — 83615 LACTATE (LD) (LDH) ENZYME: CPT | Mod: ZL | Performed by: INTERNAL MEDICINE

## 2022-01-01 RX ORDER — GADOBUTROL 604.72 MG/ML
10 INJECTION INTRAVENOUS ONCE
Status: COMPLETED | OUTPATIENT
Start: 2022-01-01 | End: 2022-01-01

## 2022-01-01 RX ORDER — CEPHALEXIN 500 MG/1
CAPSULE ORAL
COMMUNITY
Start: 2022-01-01 | End: 2022-01-01

## 2022-01-01 RX ORDER — SODIUM CHLORIDE, SODIUM LACTATE, POTASSIUM CHLORIDE, CALCIUM CHLORIDE 600; 310; 30; 20 MG/100ML; MG/100ML; MG/100ML; MG/100ML
INJECTION, SOLUTION INTRAVENOUS CONTINUOUS
Status: CANCELLED | OUTPATIENT
Start: 2022-01-01

## 2022-01-01 RX ORDER — ONDANSETRON 4 MG/1
4 TABLET, ORALLY DISINTEGRATING ORAL EVERY 30 MIN PRN
Status: CANCELLED | OUTPATIENT
Start: 2022-01-01

## 2022-01-01 RX ORDER — ERYTHROMYCIN 5 MG/G
OINTMENT OPHTHALMIC
COMMUNITY
Start: 2022-01-01 | End: 2022-01-01

## 2022-01-01 RX ORDER — LIDOCAINE 40 MG/G
CREAM TOPICAL
Status: CANCELLED | OUTPATIENT
Start: 2022-01-01

## 2022-01-01 RX ORDER — ONDANSETRON 2 MG/ML
4 INJECTION INTRAMUSCULAR; INTRAVENOUS EVERY 30 MIN PRN
Status: CANCELLED | OUTPATIENT
Start: 2022-01-01

## 2022-01-01 RX ORDER — ENOXAPARIN SODIUM 100 MG/ML
INJECTION SUBCUTANEOUS
COMMUNITY
Start: 2022-01-01

## 2022-01-01 RX ORDER — CELECOXIB 200 MG/1
200 CAPSULE ORAL 2 TIMES DAILY
COMMUNITY
Start: 2022-01-01

## 2022-01-01 RX ORDER — LEVOTHYROXINE SODIUM 125 UG/1
125 TABLET ORAL DAILY
COMMUNITY
Start: 2022-01-01

## 2022-01-01 RX ORDER — IOPAMIDOL 755 MG/ML
70 INJECTION, SOLUTION INTRAVASCULAR ONCE
Status: COMPLETED | OUTPATIENT
Start: 2022-01-01 | End: 2022-01-01

## 2022-01-01 RX ORDER — HYDROMORPHONE HYDROCHLORIDE 1 MG/ML
0.2 INJECTION, SOLUTION INTRAMUSCULAR; INTRAVENOUS; SUBCUTANEOUS EVERY 5 MIN PRN
Status: CANCELLED | OUTPATIENT
Start: 2022-01-01

## 2022-01-01 RX ORDER — PROCHLORPERAZINE MALEATE 10 MG
10 TABLET ORAL EVERY 6 HOURS PRN
Qty: 30 TABLET | Refills: 1 | Status: SHIPPED | OUTPATIENT
Start: 2022-01-01

## 2022-01-01 RX ORDER — HEPARIN SODIUM (PORCINE) LOCK FLUSH IV SOLN 100 UNIT/ML 100 UNIT/ML
5 SOLUTION INTRAVENOUS
Status: CANCELLED | OUTPATIENT
Start: 2022-01-01

## 2022-01-01 RX ORDER — FENTANYL CITRATE 50 UG/ML
50 INJECTION, SOLUTION INTRAMUSCULAR; INTRAVENOUS EVERY 5 MIN PRN
Status: CANCELLED | OUTPATIENT
Start: 2022-01-01

## 2022-01-01 RX ORDER — FENTANYL CITRATE 50 UG/ML
50 INJECTION, SOLUTION INTRAMUSCULAR; INTRAVENOUS
Status: CANCELLED | OUTPATIENT
Start: 2022-01-01

## 2022-01-01 RX ORDER — ONDANSETRON 8 MG/1
8 TABLET, FILM COATED ORAL EVERY 8 HOURS PRN
Qty: 10 TABLET | Refills: 1 | Status: SHIPPED | OUTPATIENT
Start: 2022-01-01

## 2022-01-01 RX ADMIN — GADOBUTROL 10 ML: 604.72 INJECTION INTRAVENOUS at 16:52

## 2022-01-01 RX ADMIN — IOPAMIDOL 70 ML: 755 INJECTION, SOLUTION INTRAVENOUS at 13:16

## 2022-01-01 ASSESSMENT — ENCOUNTER SYMPTOMS
NAUSEA: 0
ACTIVITY CHANGE: 1
DECREASED CONCENTRATION: 1
FATIGUE: 1
TROUBLE SWALLOWING: 0
NECK STIFFNESS: 0
BACK PAIN: 1
WOUND: 0
ADENOPATHY: 0
FEVER: 0
COUGH: 1
CONFUSION: 0
SPEECH DIFFICULTY: 0
SHORTNESS OF BREATH: 1
HEADACHES: 0
VOICE CHANGE: 0
VOMITING: 0

## 2022-01-01 ASSESSMENT — PAIN SCALES - GENERAL
PAINLEVEL: NO PAIN (0)
PAINLEVEL: MODERATE PAIN (5)

## 2022-01-01 ASSESSMENT — PATIENT HEALTH QUESTIONNAIRE - PHQ9: SUM OF ALL RESPONSES TO PHQ QUESTIONS 1-9: 3

## 2022-04-01 NOTE — PROGRESS NOTES
CT reviewed order for lung biopsy with Dr Fan.  He recommended that patient pursue bronchoscopy for further work up.  Dr Pinon nurse, Francine, updated.

## 2022-05-05 PROBLEM — C34.31 MALIGNANT NEOPLASM OF LOWER LOBE OF RIGHT LUNG (H): Status: ACTIVE | Noted: 2022-01-01

## 2022-05-05 PROBLEM — J90 PLEURAL EFFUSION: Status: ACTIVE | Noted: 2022-01-01

## 2022-05-05 PROBLEM — F32.A DEPRESSION: Status: ACTIVE | Noted: 2022-01-01

## 2022-05-05 PROBLEM — I25.10 CAD (CORONARY ARTERY DISEASE): Status: ACTIVE | Noted: 2022-01-01

## 2022-05-05 NOTE — PROGRESS NOTES
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 Miller  PCP - Dr. Benjamin         TREATMENT TO-DATE FOR THIS CANCER  Biopsy - 4-19-22 Fine Needle Biopsy  Subcranial lymph-node- Metastatic squamous carcinoma  Right middle lobe. trans bronchial aspirate: Poorly differentiated squamous carcimoma      Chemotherapy -    Oncologist - Dr. Mckinney  Hormonal therapy used - NA  Other treatments for this Cancer (including over-the-counter) -     PRIOR HISTORY OF CANCER  Cancer - (type/date/treatment/setting)   Treatment (meds)  Radiation - (date/treatment/setting)  Oncologist -   Hormonal Therapy - (type/dates used/prescriber)    RECENT IMAGING STUDIES    (list setting/date)    PET - 5-2-22  CT - Chest- 3-31-22     MRI - brain 4-26-22      LABS PERTINENT TO DIAGNOSIS  Labs drawn - (list most recent type/setting/date)   BMP   CBC w/ Platelets   LDH   CMP   CEA   Creatinine   Other - (list)     CENTRAL LINE/PORT   None   Yes - PIC / PORT - (date/setting)      ADDITIONAL INFORMATION FOR BREAST AND GYN MALIGNANCIES  Age at first menses -   Age at menopause or LMP -   Age at first pregnancy -   Number of pregnancies -   Breast Fed - Yes - Duration          /   Never       HEALTH SCREENING (list most recent dates)  Mammogram -  Colonoscopy -  Dental Exam -   T-Dap -   Pneumonia -   Flu Shot -   Shingles -     FAMILY CANCER HISTORY (list relative/type of cancer)                  TOBACCO USE HISTORY          OTHER PERTINENT CANCER INFORMATION NOT IDENTIFIED ELSEWHERE

## 2022-05-09 PROBLEM — C34.2 MALIGNANT NEOPLASM OF MIDDLE LOBE OF RIGHT LUNG (H): Status: ACTIVE | Noted: 2022-01-01

## 2022-05-09 NOTE — LETTER
[facility]  [address]  [place] [address] [phone]      May 9, 2022      Brannon Spence  [address] [address]      EMERGENCY CARE PLAN  Presenting Problem Treatment Plan   Questions or concerns during clinic hours    24 hour Nurse line available - Call main clinic line and follow prompts I will call the clinic directly: [phone]    24 Hour Nurse Line [phone]- Follow prompts and press option for nurse advisor    [facility]  [address]  [place] [address]   Patient needs to schedule an appointment  I will call the scheduling team during business hours at [phone]   Same day treatment   I will call the clinic first, then urgent care if needed   Clinic Care Coordinators [facility]  RN Clinic Care Coordinator  [phone]    [phone]   Crisis Services:  Behavioral or Mental Health [facility]  [phone]   Emergency treatment--Immediately CALL [phone]

## 2022-05-09 NOTE — NURSING NOTE
"Oncology Rooming Note    May 9, 2022 1:27 PM   Brannon Spence is a 71 year old male who presents for:    Chief Complaint   Patient presents with     Oncology Clinic Visit     Consult for Malignant neoplasm of lower lobe of right lung      Initial Vitals: /60   Pulse 93   Temp (!) 96.5  F (35.8  C) (Tympanic)   Resp 20   Ht 1.822 m (5' 11.75\")   Wt 104.9 kg (231 lb 4.2 oz)   SpO2 96%   BMI 31.58 kg/m   Estimated body mass index is 31.58 kg/m  as calculated from the following:    Height as of this encounter: 1.822 m (5' 11.75\").    Weight as of this encounter: 104.9 kg (231 lb 4.2 oz). Body surface area is 2.3 meters squared.  No Pain (0) Comment: Data Unavailable   No LMP for male patient.  Allergies reviewed: Yes  Medications reviewed: Yes    Medications: Medication refills not needed today.  Pharmacy name entered into [facility]:    [id number] - [place], [place] - [address]  [facility] [place], [place] - [address]          Anne-Marie Rodriguez LPN            "

## 2022-05-09 NOTE — PATIENT INSTRUCTIONS
You have been scheduled for a MRI of your thoracic spine.     We sent a referral to Radiation Therapy. Please stop at the desk to make an appointment    We have sent a referral to surgery to have a port placed. They will call you to set this up.

## 2022-05-09 NOTE — PROGRESS NOTES
Visit Date: 05/09/2022    REASON FOR CONSULTATION:  Squamous cell carcinoma of the right lung.    REQUESTING PROVIDERS:  1.  Waqas Benjamin MD  2.  Jean Sanchez MD  3.  Echo Miller MD    HISTORY OF PRESENT ILLNESS:  Mr. Spence is a 71-year-old white male with history of tobacco abuse, coronary artery disease, whom we were asked to evaluate concerning a new diagnosis of squamous cell carcinoma of the right lung.  Apparently, the patient had developed a cough and was treated for presumed pneumonia.  Chest x-ray revealed right middle lobe consolidation.  Cough persisted and Dr. Benjamin ordered a CT chest that was done on 03/31/2022 and was read as poorly defined right infrahilar mass, which measured 4.8 x 3.8 x 3.9 cm in dimension.  There was narrowing at the bronchus intermedius.  There was extensive mediastinal lymphadenopathy with a 2 cm right peritracheal node as well as a 1.7 cm subcarinal lymph node.  The patient subsequently was referred to Dr. Jean Sanchez of pulmonology and recommended bronchoscopy with EBUS-guided lymph node biopsy.  This was performed by Dr. Echo Miller on 04/19/2022.  The patient underwent a right middle lobe lung biopsy as well as fine needle biopsy of a subcarinal lymph node, and pathology revealed the patient had squamous cell carcinoma involving the subcarinal lymph node, as well as poorly differentiated squamous carcinoma involving the right middle lobe transbronchial aspirate.  The patient subsequently underwent a PET scan, which was read as right infrahilar lung mass with SUV max of 17.1, measuring 5.4 x 4.3 cm.  There was a right hilar lymph node, SUV max of 6.7, measuring 0.9 cm in short axis.  There was subcarinal lymph node with SUV max of 11.1, measuring 2.1 cm in short axis.  There was a T5 and T7 suspected superior endplate compression deformity with associated mild uptake.  MRI of thoracic spine was recommended.  The patient comes in today.  He requires nasal cannula  oxygen due to chronic hypoxemia.  He says he was a chronic smoker for most of his life.  He smoked 1-2 packs per day for 55 years.  He said he quit approximately 2 months ago.  He also has a history of coronary artery disease, had a stent placed in January of 2000.  ECOG performance status is a 1.  He does have chronic low back pain.  He has had lumbar laminectomy in the past and may have had some mild weight loss of 10 pounds.  He denies any fevers, night sweats, hemoptysis.  Does have chronic shortness of breath.  He also has hearing loss in the left ear.  Based on clinical staging, he would be a clinical T3 N0 M0, stage IIIB squamous cell carcinoma of the lung.    PAST MEDICAL HISTORY:  As above, history of tobacco abuse, COPD, history of DVT on anticoagulation, depression,  lumbosacral degenerative disk disease, coronary artery disease, chronic anticoagulation, hypothyroidism, pain management contract agreement.    PAST SURGICAL HISTORY:  Includes history of cataract surgery, history of colonoscopy, history of coronary artery stent placement in 2000, history of EGD, history of lumbar laminectomy, history of sinus surgery.    SOCIAL HISTORY:  He has at least a 100 pack-year smoking history.  He quit 2 months ago.  Alcohol is negative.  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    FAMILY HISTORY:  Significant for sister with uterine cancer, brother with skin cancer, uncle with unknown type of cancer.    REVIEW OF SYSTEMS  CENTRAL NERVOUS SYSTEM:  Negative for headaches, change in mental status.  HEENT:  Significant for left hearing loss.  RESPIRATORY:  Significant dyspnea at rest, no cough, no hemoptysis.  CARDIAC:  Negative for chest pain, palpitation, orthopnea, PND, ankle edema.  GASTROINTESTINAL:  Significant constipation.  No bright red blood per rectum or hematemesis or change in bowel habits.  MUSCULOSKELETAL:  Significant for chronic low back pain.  GENITOURINARY:  Negative  for hematuria or nocturia.  CONSTITUTIONAL:  Negative for fevers, night sweats.  He may have had a 10-pound weight loss.  HEMATOLOGIC:  Negative for easy bruisability, gingival bleeding, epistaxis.    PHYSICAL EXAM:    GENERAL:  He is an elderly white male, wearing 2 L O2 nasal cannula.  ECOG performance status is a 1.  VITAL SIGNS:  Reveal blood pressure 100/60, pulse 90, respirations 20, temperature is 96.5, pulse ox is 96%.  HEENT:  Atraumatic, normocephalic.  Oropharynx reveals poor dentition.  NECK:  Supple, no thyromegaly.  LUNGS:  Reveal a few rhonchi on the right.  HEART:  Regular rhythm.  S1, S2 normal.  ABDOMEN:  Soft, normoactive bowel sounds.  No mass, nontender.  LYMPHATICS:  No cervical, supraclavicular, axillary or inguinal nodes.  EXTREMITIES:  Trace ankle edema.  NEUROLOGIC:  Nonfocal.    LABORATORY DATA:  CBC with white count 9.6, H and H 16.4 and 48.9, platelet count is 248, BUN 16, creatinine 1.03, glucose 124.  LFTs are normal.  LDH is 152.    IMPRESSION:  Newly diagnosed poorly differentiated squamous cell carcinoma of the right lung with a 5.4 cm right infrahilar lung mass with biopsy-proven mediastinal adenopathy.  The patient has a clinical T3 N2 M0 squamous cell carcinoma of the lung.  He would be a candidate for concurrent chemoradiotherapy followed by maintenance durvalumab therapy, which is considered curative in these patients.  He does have borderline performance status and wears O2.  He does have some mid thoracic back pain.  We will obtain an MRI of thoracic spine to assess for compression fractures at T5, T7. Otherwise, we will refer the patient to Dr. Navi Whiteside for Radiation Oncology.  From our point of view, he will be a candidate for concurrent chemoradiotherapy with carboplatin given AUC of 2 and Taxol 45 mg/m2 to be given weekly concurrently with radiation therapy and then approximately 6-8 weeks later, he will need to start durvalumab if he has a positive response.  Will,  otherwise, await Dr. Navi Whiteside's Radiation  consultation before proceeding with chemotherapy.  He will need port access.  The patient is willing to proceed.  He was also encouraged to discontinue smoking, which he has, but still says he occasionally puts a cigarette to his mouth.  We have encouraged him to discontinue smoking completely.    TIME:  115 minutes were spent on this patient.  Time was spent reviewing multiple physician records, reviewing imaging results with Radiology, performing history and physical, documenting history, ordering followup scans and labs, including MRIs, and ordering chemotherapy with carboplatin given AUC of 2, Taxol 45 mg/m2.  We discussed side effects of chemotherapy including risk of neuropathy.  He does have some neuropathy in his feet.  We will, therefore, dose reduce his Taxol by 20%.  Otherwise, we also discussed the side effects of carboplatin including cytopenias, nausea, vomiting.  The patient is willing to proceed.    Carline Mckinney MD        D: 2022   T: 2022   MT: HCMT/DCQA    Name:     TRENTON MARROQUIN  MRN:      [id number]        Account:    [id number]   :      1950           Visit Date: 2022     Document: [id number]    cc:  MD Waqas Michelle MD Wayne Elmer, MD Jeray J. Johnson, MD

## 2022-05-09 NOTE — TELEPHONE ENCOUNTER
Referral received for port placement from .   Diagnosis: malignant neoplasm of lower lobe of right lung, and malignant neoplasm of middle lobe of right lung  Scheduled for meet and greet port placement on 5/24/22 with Dr. Quispe at [facility].   Patient has been given 2 bottles of chlorhexidine soap by oncology.  Instructions reviewed over phone.

## 2022-05-13 PROBLEM — C34.31 MALIGNANT NEOPLASM OF LOWER LOBE OF RIGHT LUNG (H): Status: RESOLVED | Noted: 2022-01-01 | Resolved: 2022-01-01

## 2022-05-13 PROBLEM — C34.31 MALIGNANT NEOPLASM OF LOWER LOBE OF RIGHT LUNG (H): Chronic | Status: ACTIVE | Noted: 2022-01-01

## 2022-05-13 NOTE — TELEPHONE ENCOUNTER
Spoke with pt and wife to schedule simulation appt for Monday 5/16/22 at 1:45pm. They preferred an afternoon time.  Mehreen CISNEROS)

## 2022-05-13 NOTE — PROGRESS NOTES
Patient was assessed using the NCCN psychosocial distress thermometer. Patient rated the score as a 6. Patient rated current stressors as breathing, getting around, memory/concentration, pain, sexual, tingling in hands/feet. Stressors will be brought to the attention of provider or Oncology RN Care Coordinator for a score of 6 or greater or per nurses discretion.

## 2022-05-13 NOTE — PROGRESS NOTES
"  [facility]  DEPARTMENT OF RADIATION ONCOLOGY  CONSULTATION NOTE    Name: Brannon Spence MRN: [id number]   : 1950 (71 year old)  Date of Service: May 13, 2022 Referring: Dr. 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 In the setting of oxygen dependence, medical oncology referred the patient for consultation about the role of concurrent chemoradiation (Primary: Right infrahilar disease measuring approximately 5.4-cm on PET-CT. Nodes: Bilateral hilar and mediastinal adenopathy extending from the subcarinal region to the upper paratracheal/lower supraclavicular region per PET-CT. Metastasis: M0 per PET-CT. Markers: None available). Note that I have classified the primary site and designated a cancer stage that are different from the prior assignments in the EMR). Among the additional co-morbidities and social determinants affecting toxicity risk are dyspnea after [id number] feet in clinic despite supplemental oxygen, limited but continued cigarette use (did not stop in 2022), loss of functional dependence (dyspnea, chronic back pain), coronary artery disease (myocardial infarction, stent in ), chronic opioids for painful degenerative lumbosacral disease (older) and thoracic spine (newer), chronic anticoagulation therapy (cardiac stent, deep vein thrombosis), mild hearing loss (left worse than right; no hearing aids), full dentures, and asbestos workplace exposure.  06/10/2019                                  vs.                                  2022 PET-CT    (Please note that EMR interfaces " "between institutions can result in loss of embedded images.)    History of Present Illness (E)   The patient's oncologic history across multiple institutions is abstracted as follows:    Diagnosis:  2019    03/07/2019 Pulmonary function test: Interpreted as significant airway obstruction with bronchodilator response and a severe diffusion defect. FVC 2.87 (60%), FEV1 2.02 (56%), FEV1/FVC 70 (92%), DLCO 12.26 (33%).    05/29/2019 Chest x-ray: Preoperative study reported as unremarkable.  2022 03/29/2022 Medicine evaluation: Presented with several weeks of worsening cough, exertional dyspnea, and fatigue. Had persisted despite antibiotic therapy (cephalexin). Also reported slight unintentional weight loss. Subsequently required supplemental oxygen around-the-clock.    03/29/2022 Chest x-ray: Since 2019, interval development of right \"middle lobe\" consolidation.     03/31/2022 Chest CT with contrast: Widespread bilateral mediastinal adenopathy ranging from less than 1-cm to approximately 2-cm (right paratracheal) and extending into the subcarinal region (1.7-cm). No suspicious infradiaphragmatic hypermetabolic adenopathy. Poorly defined right hilar/infrahilar mass measuring approximately 4.8-cm with associated volume loss/atelectasis of the right middle and lower lobes and narrowing of primary and secondary airways (bronchus intermedius, right lower lobe bronchus, and right middle lobe bronchus). Diffuse bilateral emphysematous changes. Mild T7 compression deformity is not clinically suspicious for metastatic disease.    04/15/2022 Pulmonary consultation: Had been started on supplemental oxygen around-the-clock. Recommended bronchoscopy with EBUS for diagnostic purposes.    04/19/2022 bronchoscopy with endobronchial ultrasound: Operative report described an uncomplicated procedure. Significant external compression of the right middle lobe prevented passage of the bronchoscope. Compression of the proximal right lower " "lobe visualized. Aspirations of suspicious a subcarinal node and a right infrahilar \"node\" (12R) yielded poorly differentiated squamous cell carcinoma.    [facility]2022 Brain MRI with contrast: Not suspicious for brain metastases. Minimal microvascular scheming changes and parenchymal atrophy. No acute infarct.    2022 PET-CT: Hypermetabolic right infrahilar mass measuring approximately 5.4-cm. Hypermetabolic bilateral hilar and bilateral mediastinal adenopathy ranging in size from less than 1-cm to approximately 2.1-cm (short axis) and extending from the subcarinal region to the upper paratracheal/lower supraclavicular region. Mild T5 and T7 compression deformities that are not clinically suspicious for metastatic disease. Liver and adrenal glands with no suspicious masses or uptake.    [facility]2022 Thoracic spine MRI with contrast:     2022 Port placement for chemotherapy.     2022 Pulmonary function testing.     We reviewed other conditions that can influence the choice of therapy and its morbidity. The patient has no prior in-depth knowledge of radiation therapy. The patient feels in very poor health. Over 30 years ago, the patient had been very active physically and socially. He regularly rode horses, motorcycles, ATV, etc. A workplace lower back injury led to severe pain and disability. Lumbar spine surgery permitted the patient to briefly return to work. However, lifting at work reportedly caused painful re-injury to the back and led to permanent disability for the subsequent decades and severely impaired quality of life. He has also experienced multiple lower extremity deep vein thromboses. The patient has continued to lead a very sedentary lifestyle (spends most of the day watching TV). Since the back injury, the right leg continues to sometimes \"give out\" and sometimes results in a controlled fall. The patient continues to use a left cane outside the home (at home, he " "often uses his hand to brace himself against the wall to walk). The condition has not changed for many years.    Back pain rather than dyspnea have been the patient's dominant complaint until the spring. A \"sinus\" infection earlier this year led to the onset of severe dyspnea in early March. By the time of the pulmonary consultation on 04/05/2022, the patient had become oxygen dependent around-the-clock. The oxygen provided significant improvement due to improved comfort and less anxiety about breathing. Although the dyspnea has not worsened after starting oxygen, the patient continues to \"struggle\" with activities due to dyspnea. He reports desaturating within minutes to 84% on room air. The patient became visibly very dyspneic in clinic today after walking approximately 20-30 feet from her waiting area to her examination room with supplemental oxygen (93% today on oxygen per nasal cannula). The patient denies a prior history of an upper aerodigestive tract malignancy. He denies a history of radiation exposure, chemotherapy, or collagen vascular diseases. We counseled the patient about COVID-19 risks, precautions, and potential impact on his radiation therapy. He denied recent known exposure to COVID-19, risky behaviors, or related symptoms including febrile, chest, gustatory, gastrointestinal, and systemic complaints.    Chemotherapy History: None.  Radiation History: None.  Implanted Cardiac Devices: None.  Implanted Chest Wall Infusion Port: None.    Past Medical History:   Diagnosis Date     Anemia      CAD (coronary artery disease) 05/05/2022     Cataract      Colon polyps      COPD (chronic obstructive pulmonary disease) (H)      Degeneration of lumbar or lumbosacral intervertebral disc 10/06/2006     Depression 05/05/2022     DVT, lower extremity, recurrent (H) 07/07/2015     Hyperlipidemia 06/06/2006     Hypertension 06/06/2006     Hypothyroidism      Malignant neoplasm of lower lobe of right lung (H) " 05/05/2022     Pleural effusion 05/05/2022     Past Surgical History:   Procedure Laterality Date     CATARACT IOL, RT/LT  2019     COLONOSCOPY  2012     ENDOSCOPY UPPER, COLONOSCOPY, COMBINED N/A 04/29/2019    Procedure: UPPER ENDOSCOPY WITH BIOPSY AND APC OF STOMACH,  COLONOSCOPY WITH POLYPECTOMY AND BIOPSY;  Surgeon: Nacho Shore MD;  Location: [facility] OR     ESOPHAGOSCOPY, GASTROSCOPY, DUODENOSCOPY (EGD), COMBINED N/A 06/17/2019    Procedure: UPPER ENDOSCOPY WITH BIOPSIES;  Surgeon: Nacho Shore MD;  Location: [facility] OR     LUMBAR LAMINECTOMY       SINUS SURGERY       STENT  2002     Outpatient Encounter Medications as of 5/13/2022   Medication Sig Dispense Refill     aspirin (ASA) 325 MG tablet Take 81 mg by mouth        atorvastatin (LIPITOR) 40 MG tablet TAKE 1 TABLET DAILY AT BEDTIME       celecoxib (CELEBREX) 200 MG capsule Take 200 mg by mouth 2 times daily       cyclobenzaprine (FLEXERIL) 10 MG tablet TAKE 1 TABLET THREE TIMES A DAY       diclofenac (VOLTAREN) 1 % topical gel Apply 4 g topically       gabapentin (NEURONTIN) 300 MG capsule TAKE 2 CAPSULES THREE TIMES A DAY       hydrochlorothiazide (HYDRODIURIL) 25 MG tablet        HYDROcodone-acetaminophen (NORCO) 5-325 MG tablet        levothyroxine (SYNTHROID/LEVOTHROID) 125 MCG tablet Take 125 mcg by mouth daily       lisinopril (PRINIVIL/ZESTRIL) 20 MG tablet        metoprolol tartrate (LOPRESSOR) 25 MG tablet        omeprazole (PRILOSEC) 20 MG DR capsule Take 1 capsule (20 mg) by mouth daily 60 capsule 1     [START ON 5/17/2022] ondansetron (ZOFRAN) 8 MG tablet Take 1 tablet (8 mg) by mouth every 8 hours as needed for nausea (vomiting) 10 tablet 1     PARoxetine (PAXIL) 20 MG tablet TK 1 T PO ONCE D.  1     PROAIR  (90 Base) MCG/ACT inhaler        [START ON 5/17/2022] prochlorperazine (COMPAZINE) 10 MG tablet Take 1 tablet (10 mg) by mouth every 6 hours as needed (Nausea/Vomiting) 30 tablet 1     TRELEGY ELLIPTA 200-62.5-25 MCG/INH oral  "inhaler TAKE 1 INHALATION DAILY RINSE MOUTH AND SPIT OR BRUSH TEETH AFTER EACH USE.       warfarin (COUMADIN) 5 MG tablet        enoxaparin ANTICOAGULANT (LOVENOX) 100 MG/ML syringe PLEASE SEE ATTACHED FOR DETAILED DIRECTIONS (Patient not taking: No sig reported)       nitroGLYcerin (NITROSTAT) 0.4 MG sublingual tablet Place 1 tablet under the tongue every 5 minutes as needed (Patient not taking: No sig reported)       tadalafil (CIALIS) 20 MG tablet Take 20 mg by mouth (Patient not taking: No sig reported)       No facility-administered encounter medications on file as of 5/13/2022.     Allergies/Reactions: Penicillins    Orders via [email or web address] EMR:   Orders Placed This Encounter   Procedures     Palliative Care Referral     Oxygen: Nasal cannula     Social History     Social History Narrative    Wife-Perlita. Has 4 biological children. Has many step-children and grandchildren that he considers his own. Used to enjoy riding motorcycles, dirt bikes, AVS, and horses (14 horses) but is now limited due to health. Says he mostly just watches TV all day and does not read due to issues with concentration. Information above obtained: 5/13/2022     Socioeconomic History     Marital status:      Spouse name: Rika     Number of children: 4     Highest education level: High school graduate   Occupational History     Occupation: Retired: , , and various other jobs there     Employer: [employer]     Comment: Retired     Social History     Tobacco Use     Smoking status: Current Every Day Smoker     Packs/day: 2.00     Types: Cigarettes     Start date: 1966     Smokeless tobacco: Never Used     Tobacco comment: Patient lights cigarette and \"swishes smoke around in mouth\" and blows it out, Brannon does not consider this smoking   Vaping Use     Vaping Use: Former   Substance Use Topics     Alcohol use: Not Currently     Comment: 1 beer a month but goes months without     Drug use: Never "     Family History   Problem Relation Age of Onset     Coronary Artery Disease Mother      Coronary Artery Disease Father      Diabetes Sister      Uterine Cancer Sister      Coronary Artery Disease Sister      Other - See Comments Brother      Pneumonia Brother      Coronary Artery Disease Brother      Coronary Artery Disease Brother      Heart Failure Brother      Skin Cancer Brother      Coronary Artery Disease Daughter      Other - See Comments Paternal Uncle         Patient thinks he may have had colon cancer   No other cancers in first or second degree relatives.    Baseline Review of Systems (prior to radiation therapy; supplemental to the History)   ROS (score of 0 indicates that symptom is at baseline for most sections below): See Flowsheet for additional comments as indicated.  Moderate Pain (5) due to decades of lower back pain and more recent mid thoracic back pain (approximately 1 month).   Review of Systems   Constitutional: Positive for activity change (chronically sedentary) and fatigue. Negative for fever.   HENT: Positive for dental problem (upper and lower full dentures) and hearing loss. Negative for trouble swallowing and voice change.    Eyes: Negative for visual disturbance.   Respiratory: Positive for cough and shortness of breath.    Cardiovascular: Negative for chest pain.   Gastrointestinal: Negative for nausea and vomiting.   Musculoskeletal: Positive for back pain and gait problem. Negative for neck stiffness.   Skin: Negative for rash and wound.   Allergic/Immunologic: Negative for immunocompromised state.   Neurological: Negative for speech difficulty and headaches.   Hematological: Negative for adenopathy.   Psychiatric/Behavioral: Positive for decreased concentration (chronic). Negative for behavioral problems and confusion.     Patient Health Questionnaire-9 (PHQ-9)     PHQ 5/9/2022   PHQ-9 Total Score 3   Q9: Thoughts of better off dead/self-harm past 2 weeks Not at all      Physical Exam   KPS: 60 (requires occasional assistance but cares for most personal needs).  ECOG Status: 3 (Capable of only limited self-care; needs help with ADLs; in bed/chair >50% of waking hours)  Vitals: Ht 1.829 m (6')   Wt 104.1 kg (229 lb 8 oz)   SpO2 93%   BMI 31.13 kg/m    Wt Readings from Last 3 Encounters:   05/13/22 104.1 kg (229 lb 8 oz)   05/09/22 104.9 kg (231 lb 4.2 oz)   06/17/19 113.4 kg (250 lb)     Clinical Examination:  General: Appears clinically/medically relatively unstable but to a degree that requires admission. Appears in very poor health due to dyspnea general health. Mildly obese (class I, BMI 30-34). Appears very fatigued. Appears stated age. Appears fairly developed and well-nourished. Appears to be chronically in acute moderate distress due to dyspnea and pain. Appears fairly groomed. No cigarette odor.  Head: Natural alopecia pattern. Normocephalic.  Eyes: No glasses. Anicteric, vision intact.  ENT: Good volume. No hoarseness.  Neck: Soft, supple, symmetric, trachea midline.  Lymphatics: No cervical, supraclavicular, axillary, or parasternal adenopathy.  Chest/Breasts: No port. No implanted cardiac device.  Lungs: Slightly forward leaning throughout visit. Supplemental oxygen per nasal cannula via portable oxygen generator. Slightly distant breath sounds without wheezing or stridor. No use of accessory musculature. No delay in phases.  Cardiovascular: No jugulovenous distension. No carotid pulsations. RRR, S1, S2.  Abdomen: Obesely distended. Otherwise, nondistended and nontender. Soft. Bowel sounds positive.  Pelvis: Obesely distended. Otherwise, nondistended and nontender. Soft. Bowel sounds positive.  MSK: Mild clubbing. Good muscle tone. No pain on palpation. No upper or lower extremity edema. No cords or calf tenderness.  Integument: No jaundice or pallor.   Neurologic: Right-handed. Left cane due to back pain while he drags his portable oxygen unit with his right hand.  Slightly slowed gait. Confident gait. Alert, oriented, fluent. Memory intact. Motor intact. Sensory intact.   Psychologic: Became sadder but not tearful during our visit. Processed information well without surprise despite the disappointing information context. Well-spoken about his cancer and therapy. Excellent recollection about prior discussions with his medical team over the last month. Good dynamic with his wife. At the beginning of the visit, expressed motivation to pursue potentially curative therapy. Clear, consistent thoughts and opinions. Led portions of the discussion. Good level of critical thinking and intelligence. Good comprehension and processing of new information. Did not require repeating of questions or answers. Progressively complex discussions and questions. Well-constructed answers. Complex speech and thought patterns. Appropriately anxious about radiation therapy and sad about diagnosis. Pleasant, cooperative, insightful, concrete, and good judgment.    Physician Time on Day of Encounter, and MDM Data Reviewed and Analyzed on Day of Encounter   Time spent on patient activities is based on Chart review, Visit, and Documentation. Total time: 315 minutes plus 3 additional minutes for smoking cessation.    MDM based on:       High risk element:  o Incurable locoregionally-advanced lung cancer. Possible pending radiation therapy.       Tests, documents, or independent historian(s) analyses include but are not limited to:  o Those referenced above in the HPI.       Independent Interpretations of tests include but are not limited to:  o Those referenced above in the HPI.  o Orders as noted above.  o Ordered CT-a simulation.       Discussion with the medical team about management or test interpretation include but are not limited to:  o Reviewed with medical oncology and the primary care goals of care, coordination of care, and recommended treatment options.    Smoking Cessation (3-10 minutes in  addition to Encounter Time)   I counseled the patient about smoking cessation. His smoking history is noted above. Continues to actively smoke. Reviewed motivations, factors, and confrontations that seem to promote, sometimes unintentionally, rather than deter him from decreasing use of cigarettes. We counseled him that a principal issue with smoking is it increases the risk of complications such as second malignancy and cardiopulmonary toxicity after radiation therapy. Smoking can decrease the efficacy of radiation therapy. We spoke about various behavioral and pharmacologic interventions for cessation. The patient declined referral for counseling for cessation but agreed to contact us if referral or interventions are eventually desired.    Physician Information Review   I reviewed the case with the patient and his wife, evaluated him, and discussed his treatment options and risks of therapy. I reviewed the medical records, radiographic reports, and pathologic studies. I reviewed the imaging studies via PACS and with the patient and his wife. I reviewed our intake sheets. The patient and his wife are good historians, began the visit with good insights into the disease process, and acknowledged the poor consequences of his disease. They educated themselves through a variety of educational media including the Internet (although they initially downplayed their use of the Internet, they later acknowledged their investigation and asked questions based upon that information). They demonstrated good comprehension of our discussion and explored the treatment options with good understanding. They asked pertinent questions and insightful follow-up questions. I illustrated the basic anatomy and field of treatment. We discussed the onsite and telemedicine coverage of our clinic. I offered to speak with his family members and friends today by speakerphone. The patient declined my offer but granted me permission to speak with  them if they contact me or come to clinic. They declined referral for an additional opinion or conservative care. They accepted referral to our social work staff and our palliative care service for additional resources including potential counseling. The patient granted me permission to exchange medical information and records with other healthcare professionals. No therapeutic radiation protocols for the patient's disease are available at our Center.. The patient was ready to learn and demonstrated no learning barriers. His learning preferences included listening. He was given ample opportunity to ask questions, and all questions were answered to his satisfaction. We reviewed educational materials including Internet resources and provided the patient with written materials.    We discussed the patient's diagnosis of a right lower lung field squamous cell carcinoma. We reviewed my interpretation of the regional anatomy, stage designation, and risk-group. We reviewed patterns of failure after various local and systemic treatments. We reviewed his poor prognosis even with aggressive multimodality treatment without distant metastatic disease. We discussed factors specific to his disease including personal circumstances, comorbidities, and other factors potentially impacting the risk of toxicity and clinical outcomes. We reviewed the concept of multimodality care and the relative contribution of each to the paradigm of treating locoregionally advanced cancer. We discussed the use of radiation therapy as the principal local therapeutic modality and omission of surgery for upfront local therapy. We discussed limitations of radiographic imaging in identifying the extent of disease. We discussed the potentially beneficial role of radiation therapy in the context of evolving standards and national guidelines of oncologic care such as the NCCN, ACR, and ZAHIRA as well as management during the COVID-19 pandemic.    We discussed  the nature of external beam radiation therapy from a [employer] TrueBeam linac, concept of CT-based simulation, role of computerized treatment planning, and potential interventions to reduce the toxicity of radiation while improving the efficacy of treatment for an obstructing non-small cell lung cancer. We would likely use a limited form of image-guidance of 3D beam arrangements. Together, these techniques permit good coverage of complex target tissues (primary lung disease) while maintaining adequate sparing of normal critical structures (spinal cord, esophagus, heart, lung, etc.). I would not use stereotactic radiation therapy, protons, TomoTherapy, brachytherapy, or radioprotectant agents. I do not feel that these methods offer a clear benefit for this patient.    We discussed the relative risks, benefits, rationale, potential side effects, alternatives, and adjuncts to radiation therapy to the central right lower lung field for lung cancer. Toxicities can be acute or chronic, and can negatively impact long-term quality of life. They can require high levels of supportive care and breaks in radiation therapy. I anticipate at least a mild degree of acute esophageal toxicity and no significant improvement of pulmonary function during the course of treatment. I explained that I anticipate that the disease might progress during radiation therapy and that pulmonary function might also diminished during radiation therapy. Disease outside the field of radiation therapy will continue to progress. Among the factors will increase the risk of toxicity are the co-morbidities and circumstances noted above. The toxicities include but are not limited to the esophagus (pain, odynophagia, dysmotility, perforation, fistula, bleeding, etc.), lung (dyspnea, oxygen dependence, cough, pneumonitis, pleuritis, pleural effusion, etc.), primary airways (bronchial necrosis, fistula, obstruction, etc.), great vessels (catastrophic hemorrhage,  aneurysm, obstruction, etc.), heart (infarction, pericarditis, pericardial effusion, etc.), hematopoietic system (anemia, thrombocytopenia, neutropenia, etc.), spinal cord (myelitis, weakness, paralysis, sensory loss, pain, bowel or bladder incontinence, etc.), chest wall (soft tissue and bone necrosis, etc.), skin (poor cosmesis, erythema, hyperpigmentation, desquamation, breakdown, fibrosis, pruritus, etc.), soft tissue necrosis, bone necrosis, pain, infection, and other organs as well as systemic toxicities (e.g., fatigue) and long-term risks such as second malignancy.     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 By itself, the very large long extent of the bilateral intrathoracic node disease (lower upper paratracheal/lower supraclavicular to the subcarinal region) renders the patient likely incurable even with aggressive concurrent chemoradiation he were very healthy, much younger, and had an excellent performance status (note: surgery is not indicated due to presence of >N1 diease, clinically poor pulmonary function, very poor functional status, and likely requirement of a pneumonectomy for an oncologically adequate primary site management). In addition for this patient, his large primary size, chronic respiratory compromise, recent additional respiratory compromises, oxygen dependence, and long history of cigarette further render the patient an inappropriate candidate for concurrent chemoradiation whether or not treatment is given with potentially curative intent (independent of his age). I therefore feel that the designated intent of treatment should be palliative rather than curative.    With regard to palliative intent, I am doubtful that radiation therapy will significantly improve the patient's overall quality of life for either the short-term or long-term. The  patient principal complaint is the severe dyspnea dyspnea. I explained that I feel that eventual further progression of airway obstruction causing further right lung collapse and leading to progressive right-sided respiratory failure seems to be the most likely cause of his eventual terminal event, a timeline that might be protracted and thus lead to an extended period of physical and psychological difficulty. Although the patient might be able to tolerate a modest radiation field directed mainly to the visible primary disease, adjacent collapsed lung, and intervening primary airway, I feel that such thoracic radiation therapy is unlikely to provide either significant short-term improvement of respiratory function or provide durable palliation of respiratory function through relief airway obstruction (radiation therapy would not be intended to significantly prolong the patient's life). I anticipate that the disease would only partially respond at best and would likely quickly regrow even if radiation therapy is delivered with concurrent chemotherapy (I am uncertain if the patient is an appropriate candidate for cytotoxic chemotherapy). I am doubtful that regaining patency of the left lower lobe bronchus will result in reinflation of the lower lobe since the most of the consolidated lung disease appears to be tumor per PET-CT (only a small amount of nonhypermetabolic consolidated soft tissue lies adjacent to the hypermetabolic consolidative lung tissue). As an alternative or adjunct to radiation therapy, medical oncology could consider referring the patient back to the pulmonary service for their expert opinion about the merits of stent placement since the left lower lobe is already severely obstructed and the middle lobe bronchus is likely nearing obstruction per bronchoscopy and CT (note that the left lower lobe is already occluded to the point that passage of a standard bronchoscope was not feasible). However, I  am doubtful that the service would offer such procedure if since it was not performed at the prior bronchoscopy.    With some hesitation, the patient ultimately wished to pursue a palliative course of radiation therapy with a goal of improving airway obstruction and pulmonary function. Most of the hesitation was due to the low probability of palliative benefit and the challenges inherent in traveling for daily radiation therapy. The patient understands that he can change his mind at any time and can stop radiation therapy at any time during the process leading up to simulation and during the course of radiation therapy. The patient felt the decision to pursue the opportunity of radiation therapy was not due to pressure and did not create pressure about treatment. We therefore obtained written consent. The patient and his wife wished proceed as noted below. We answered all questions to their satisfaction. Thank you for allowing us to participate in the care of this very pleasant patient.    Physician Radiation Therapy Plan   1) Radiation therapy simulation: We will contact the patient to schedule CT-a simulation at his convenience. Respiratory management will not be utilized.  2) Radiation therapy treatment: Anticipate treating the patient with a moderately hypofractionated palliative regimen of radiation therapy to the lower central thoracic region of the right lung field and a total of 10-12 fractions.  3) Pain & Palliative Care Service: Referral placed for guidance from that service for the patient's incurable, severely symptomatic lung cancer and incurable, severely symptomatic benign traumatic vertebral disease.  4) Medical oncology: Patient is referred back to that serve for further discussion about the merits of systemic therapy (I do not recommend concurrent toxic chemotherapy with thoracic radiation therapy due to the added potential toxicity (esophagus, spinal cord) and low probability of added benefit  (rate of response, duration of response, etc.). I am uncertain if the service will continue to recommend port placement, thoracic spine MRI, etc.  5) COVID-19: Discussed the potential impact of the pandemic on his treatment and our ability to deliver therapy. Discussed current strategies and approaches currently being used in our Department to treat positive, negative, and suspected patients regarding COVID-19.      Navi Whiteside M.D., Ph.D.  Department of Radiation Oncology  Tel: [phone]  Fax: [phone]    Care Team:  Carline Mckinney M.D. (medical oncology)  Jean Sanchez M.D. (pulmonary medicine)  Echo Miller M.D. (interventional pulmonary medicine)  Charbel Rios M.D. (orthopedic surgery)  Waqas Benjamin M.D. (medicine)

## 2022-05-16 NOTE — TELEPHONE ENCOUNTER
Called patient, no answer, message left. Message for patient to let him know per Dr. Whiteside's request the pulmonary function test on June 20th, 2022 has been canceled.  The Covid required test on June 17th prior to pulmonary test has also been canceled.  Radiation therapy phone number has been left for patient to call if he has any questions.

## 2022-05-17 NOTE — OR NURSING
Called patient for pre-anesthesia testing surgical education.  Patient states he is cancelling this procedure because he doesn't think there is any hope after talking with the radiologist.  He states he will try and just live the best he can.  He wants to talk to his Pulmonologist to help him manage palliative care.

## 2022-05-17 NOTE — TELEPHONE ENCOUNTER
From: Darleen Ayala RN   Sent: 5/17/2022   2:35 PM CDT   To: Carline Mckinney MD, Yaakov Quispe MD, *   Subject: Patient cancelling procedure                     Brannon is scheduled 05/24 for port-a-cath placement with Dr. Quispe.  Patient states he is cancelling this procedure because he wants to meet with his Pulmonologist first.  Brannon states he talked with radiologist and believes the outcome is not good.       Patients procedure has been cancelled. Zoey Bliss LPN

## 2022-05-17 NOTE — TELEPHONE ENCOUNTER
Received message from Ayana Gloria RN with palliative care. They are unable to admit patient d/t nursing coverage in patients area.

## 2022-06-10 NOTE — PROGRESS NOTES
[facility]: Cancer Care                                                                                          Received return call from patient reporting that he has decided to not seek any treatment for his lung cancer. He states he will let it run its course.    Signature:  Annabelle Ferreira RN

## (undated) DEVICE — FORCEP-COLON BIOPSY STD W/NEEDLE 160CM

## (undated) DEVICE — MOUTHPIECE W/GUARD FOR ENDOSCOPY

## (undated) DEVICE — SNARE-ROTATABLE 20MM MINI OVAL

## (undated) DEVICE — IRRIGATION-H2O 1000ML

## (undated) DEVICE — CANISTER-SUCTION 2000CC

## (undated) DEVICE — CAUTERY PAD-POLYHESIVE II ADULT

## (undated) DEVICE — APPLICATOR-CHLORAPREP 26ML TINTED CHG 2%+ 70% IPA-SURGICAL

## (undated) DEVICE — TUBING-SUCTION 20FT

## (undated) DEVICE — SENSOR-OXISENSOR II ADULT

## (undated) DEVICE — PROBE-ERBE APC 2.3MM 7FR. CIRCUMFERENTIAL

## (undated) DEVICE — CONNECTOR-ERBEFLO 2 PORT

## (undated) DEVICE — TRAP-POLYP E-TRAP

## (undated) DEVICE — LUBRICANT JELLY 2OZ. TUBE

## (undated) DEVICE — SYRINGE-30CC SLIP TIP

## (undated) DEVICE — FORCEP-COLON BIOPSY LARGE W/NEEDLE 240CM

## (undated) RX ORDER — PROPOFOL 10 MG/ML
INJECTION, EMULSION INTRAVENOUS
Status: DISPENSED
Start: 2019-06-17

## (undated) RX ORDER — LIDOCAINE HYDROCHLORIDE 20 MG/ML
INJECTION, SOLUTION EPIDURAL; INFILTRATION; INTRACAUDAL; PERINEURAL
Status: DISPENSED
Start: 2019-04-29

## (undated) RX ORDER — LIDOCAINE HYDROCHLORIDE 20 MG/ML
INJECTION, SOLUTION EPIDURAL; INFILTRATION; INTRACAUDAL; PERINEURAL
Status: DISPENSED
Start: 2019-06-17

## (undated) RX ORDER — PROPOFOL 10 MG/ML
INJECTION, EMULSION INTRAVENOUS
Status: DISPENSED
Start: 2019-04-29